# Patient Record
Sex: FEMALE | Race: WHITE | NOT HISPANIC OR LATINO | Employment: FULL TIME | URBAN - METROPOLITAN AREA
[De-identification: names, ages, dates, MRNs, and addresses within clinical notes are randomized per-mention and may not be internally consistent; named-entity substitution may affect disease eponyms.]

---

## 2017-02-04 ENCOUNTER — COMMUNICATION - HEALTHEAST (OUTPATIENT)
Dept: FAMILY MEDICINE | Facility: CLINIC | Age: 37
End: 2017-02-04

## 2017-02-07 ENCOUNTER — OFFICE VISIT - HEALTHEAST (OUTPATIENT)
Dept: FAMILY MEDICINE | Facility: CLINIC | Age: 37
End: 2017-02-07

## 2017-02-07 DIAGNOSIS — R05.9 COUGH: ICD-10-CM

## 2017-02-07 ASSESSMENT — MIFFLIN-ST. JEOR: SCORE: 1186.56

## 2017-02-09 ENCOUNTER — COMMUNICATION - HEALTHEAST (OUTPATIENT)
Dept: FAMILY MEDICINE | Facility: CLINIC | Age: 37
End: 2017-02-09

## 2017-06-13 ENCOUNTER — COMMUNICATION - HEALTHEAST (OUTPATIENT)
Dept: FAMILY MEDICINE | Facility: CLINIC | Age: 37
End: 2017-06-13

## 2017-06-13 DIAGNOSIS — I47.19 JUNCTIONAL TACHYCARDIA (H): ICD-10-CM

## 2017-11-21 ENCOUNTER — OFFICE VISIT - HEALTHEAST (OUTPATIENT)
Dept: FAMILY MEDICINE | Facility: CLINIC | Age: 37
End: 2017-11-21

## 2017-11-21 DIAGNOSIS — M54.41 ACUTE BILATERAL LOW BACK PAIN WITH BILATERAL SCIATICA: ICD-10-CM

## 2017-11-21 DIAGNOSIS — G44.229 CHRONIC TENSION-TYPE HEADACHE, NOT INTRACTABLE: ICD-10-CM

## 2017-11-21 DIAGNOSIS — M54.42 ACUTE BILATERAL LOW BACK PAIN WITH BILATERAL SCIATICA: ICD-10-CM

## 2017-11-21 ASSESSMENT — MIFFLIN-ST. JEOR: SCORE: 1194.78

## 2017-11-21 NOTE — ASSESSMENT & PLAN NOTE
She has been studying for her graduate program a lot lately and started to have some generalized low back pain.  This worsened after she used a heating pad for prolonged period of time.  The painhas been persistent and she is reluctant to take ibuprofen.  After we talked today she did agree to take 600 mg's of ibuprofen 3 times daily.  We will also start some physical therapy to see if we can help strengthen  these muscles and relieve the pain.

## 2017-11-21 NOTE — ASSESSMENT & PLAN NOTE
Pt/ot recommended.   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  She says she has a lot of forward postures and a lot of neck and shoulder tightness.  These lead to headaches.  Massage is not helpful because her muscles are so tense.  Recommend physical therapy/occupational therapy to learn scapular depressor exercises and to prevent worsening.

## 2017-11-28 ENCOUNTER — OFFICE VISIT - HEALTHEAST (OUTPATIENT)
Dept: PHYSICAL THERAPY | Facility: REHABILITATION | Age: 37
End: 2017-11-28

## 2017-11-28 DIAGNOSIS — M54.6 PAIN IN THORACIC SPINE: ICD-10-CM

## 2017-11-28 DIAGNOSIS — M54.41 ACUTE BILATERAL LOW BACK PAIN WITH BILATERAL SCIATICA: ICD-10-CM

## 2017-11-28 DIAGNOSIS — M54.42 ACUTE BILATERAL LOW BACK PAIN WITH BILATERAL SCIATICA: ICD-10-CM

## 2018-02-05 ENCOUNTER — COMMUNICATION - HEALTHEAST (OUTPATIENT)
Dept: FAMILY MEDICINE | Facility: CLINIC | Age: 38
End: 2018-02-05

## 2018-02-16 ENCOUNTER — COMMUNICATION - HEALTHEAST (OUTPATIENT)
Dept: FAMILY MEDICINE | Facility: CLINIC | Age: 38
End: 2018-02-16

## 2018-02-16 DIAGNOSIS — I47.19 JUNCTIONAL TACHYCARDIA (H): ICD-10-CM

## 2018-03-28 ENCOUNTER — OFFICE VISIT - HEALTHEAST (OUTPATIENT)
Dept: FAMILY MEDICINE | Facility: CLINIC | Age: 38
End: 2018-03-28

## 2018-03-28 DIAGNOSIS — Z00.00 HEALTHCARE MAINTENANCE: ICD-10-CM

## 2018-03-28 DIAGNOSIS — F41.9 ANXIETY: ICD-10-CM

## 2018-03-28 DIAGNOSIS — N94.3 PREMENSTRUAL SYNDROME: ICD-10-CM

## 2018-03-28 DIAGNOSIS — R14.1 GAS PAIN: ICD-10-CM

## 2018-03-28 LAB
CHOLEST SERPL-MCNC: 147 MG/DL
FASTING STATUS PATIENT QL REPORTED: YES
FASTING STATUS PATIENT QL REPORTED: YES
GLUCOSE BLD-MCNC: 88 MG/DL (ref 70–99)
HDLC SERPL-MCNC: 57 MG/DL
HGB BLD-MCNC: 13.9 G/DL (ref 12–16)
LDLC SERPL CALC-MCNC: 77 MG/DL
TRIGL SERPL-MCNC: 67 MG/DL

## 2018-03-28 ASSESSMENT — MIFFLIN-ST. JEOR: SCORE: 1186.28

## 2018-06-05 ENCOUNTER — COMMUNICATION - HEALTHEAST (OUTPATIENT)
Dept: FAMILY MEDICINE | Facility: CLINIC | Age: 38
End: 2018-06-05

## 2018-06-05 DIAGNOSIS — I47.19 JUNCTIONAL TACHYCARDIA (H): ICD-10-CM

## 2018-10-18 ENCOUNTER — AMBULATORY - HEALTHEAST (OUTPATIENT)
Dept: NURSING | Facility: CLINIC | Age: 38
End: 2018-10-18

## 2019-06-24 ENCOUNTER — COMMUNICATION - HEALTHEAST (OUTPATIENT)
Dept: FAMILY MEDICINE | Facility: CLINIC | Age: 39
End: 2019-06-24

## 2019-06-24 DIAGNOSIS — I47.19 JUNCTIONAL TACHYCARDIA (H): ICD-10-CM

## 2019-07-08 ENCOUNTER — OFFICE VISIT - HEALTHEAST (OUTPATIENT)
Dept: FAMILY MEDICINE | Facility: CLINIC | Age: 39
End: 2019-07-08

## 2019-07-08 DIAGNOSIS — Z00.00 ROUTINE GENERAL MEDICAL EXAMINATION AT A HEALTH CARE FACILITY: ICD-10-CM

## 2019-07-08 DIAGNOSIS — I47.19 JUNCTIONAL TACHYCARDIA (H): ICD-10-CM

## 2019-07-08 LAB
ALBUMIN SERPL-MCNC: 4.1 G/DL (ref 3.5–5)
ALP SERPL-CCNC: 56 U/L (ref 45–120)
ALT SERPL W P-5'-P-CCNC: 17 U/L (ref 0–45)
ANION GAP SERPL CALCULATED.3IONS-SCNC: 11 MMOL/L (ref 5–18)
AST SERPL W P-5'-P-CCNC: 17 U/L (ref 0–40)
BILIRUB SERPL-MCNC: 0.4 MG/DL (ref 0–1)
BUN SERPL-MCNC: 18 MG/DL (ref 8–22)
CALCIUM SERPL-MCNC: 9.8 MG/DL (ref 8.5–10.5)
CHLORIDE BLD-SCNC: 106 MMOL/L (ref 98–107)
CHOLEST SERPL-MCNC: 139 MG/DL
CO2 SERPL-SCNC: 24 MMOL/L (ref 22–31)
CREAT SERPL-MCNC: 0.83 MG/DL (ref 0.6–1.1)
FASTING STATUS PATIENT QL REPORTED: NO
GFR SERPL CREATININE-BSD FRML MDRD: >60 ML/MIN/1.73M2
GLUCOSE BLD-MCNC: 64 MG/DL (ref 70–125)
HDLC SERPL-MCNC: 55 MG/DL
HGB BLD-MCNC: 13.9 G/DL (ref 12–16)
LDLC SERPL CALC-MCNC: 72 MG/DL
POTASSIUM BLD-SCNC: 4.2 MMOL/L (ref 3.5–5)
PROT SERPL-MCNC: 7.4 G/DL (ref 6–8)
SODIUM SERPL-SCNC: 141 MMOL/L (ref 136–145)
TRIGL SERPL-MCNC: 62 MG/DL
TSH SERPL DL<=0.005 MIU/L-ACNC: 1.17 UIU/ML (ref 0.3–5)

## 2019-07-08 ASSESSMENT — MIFFLIN-ST. JEOR: SCORE: 1191.38

## 2020-08-01 ENCOUNTER — AMBULATORY - HEALTHEAST (OUTPATIENT)
Dept: MULTI SPECIALTY CLINIC | Facility: CLINIC | Age: 40
End: 2020-08-01

## 2020-08-01 ENCOUNTER — TRANSFERRED RECORDS (OUTPATIENT)
Dept: MULTI SPECIALTY CLINIC | Facility: CLINIC | Age: 40
End: 2020-08-01

## 2020-08-01 LAB
PAP SMEAR - HIM PATIENT REPORTED: NORMAL
PAP SMEAR - HIM PATIENT REPORTED: NORMAL

## 2020-08-03 ENCOUNTER — COMMUNICATION - HEALTHEAST (OUTPATIENT)
Dept: FAMILY MEDICINE | Facility: CLINIC | Age: 40
End: 2020-08-03

## 2020-08-03 DIAGNOSIS — I47.19 JUNCTIONAL TACHYCARDIA (H): ICD-10-CM

## 2020-08-11 ENCOUNTER — OFFICE VISIT - HEALTHEAST (OUTPATIENT)
Dept: FAMILY MEDICINE | Facility: CLINIC | Age: 40
End: 2020-08-11

## 2020-08-11 DIAGNOSIS — I47.19 JUNCTIONAL ECTOPIC TACHYCARDIA (H): ICD-10-CM

## 2020-11-02 ENCOUNTER — COMMUNICATION - HEALTHEAST (OUTPATIENT)
Dept: FAMILY MEDICINE | Facility: CLINIC | Age: 40
End: 2020-11-02

## 2020-11-02 DIAGNOSIS — I47.19 JUNCTIONAL TACHYCARDIA (H): ICD-10-CM

## 2021-01-11 RX ORDER — DESOGESTREL AND ETHINYL ESTRADIOL 21-5 (28)
KIT ORAL
COMMUNITY
Start: 2020-06-11 | End: 2023-11-03

## 2021-01-11 RX ORDER — ATENOLOL 25 MG/1
TABLET ORAL
COMMUNITY
Start: 2020-11-04 | End: 2021-08-06

## 2021-01-12 ENCOUNTER — OFFICE VISIT (OUTPATIENT)
Dept: FAMILY MEDICINE | Facility: CLINIC | Age: 41
End: 2021-01-12
Payer: COMMERCIAL

## 2021-01-12 VITALS
TEMPERATURE: 98.3 F | SYSTOLIC BLOOD PRESSURE: 98 MMHG | DIASTOLIC BLOOD PRESSURE: 62 MMHG | HEIGHT: 65 IN | BODY MASS INDEX: 20.35 KG/M2 | HEART RATE: 68 BPM | WEIGHT: 122.13 LBS | RESPIRATION RATE: 12 BRPM

## 2021-01-12 DIAGNOSIS — M54.6 ACUTE LEFT-SIDED THORACIC BACK PAIN: Primary | ICD-10-CM

## 2021-01-12 PROCEDURE — 99213 OFFICE O/P EST LOW 20 MIN: CPT | Performed by: FAMILY MEDICINE

## 2021-01-12 RX ORDER — DAPSONE 50 MG/G
GEL TOPICAL
COMMUNITY
Start: 2020-12-09 | End: 2022-06-20

## 2021-01-12 RX ORDER — CYCLOBENZAPRINE HCL 5 MG
5 TABLET ORAL 3 TIMES DAILY PRN
Qty: 20 TABLET | Refills: 1 | Status: SHIPPED | OUTPATIENT
Start: 2021-01-12 | End: 2022-06-20

## 2021-01-12 RX ORDER — ADAPALENE GEL USP, 0.3% 3 MG/G
GEL TOPICAL
COMMUNITY
Start: 2020-01-26

## 2021-01-12 ASSESSMENT — MIFFLIN-ST. JEOR: SCORE: 1216.9

## 2021-01-12 NOTE — PROGRESS NOTES
Assessment/Plan:    Mary Anne Sanchez is a 40 year old female presenting for:    Acute left-sided thoracic back pain  This seems to be very muscular nature.  Flexeril sent to the pharmacy and discussed the risks and benefits of the medication.  A sickle therapy referral was placed as well.  Discussed some different exercises she could do.  She is planning on seeing her chiropractor as well.  - PHYSICAL THERAPY REFERRAL  - cyclobenzaprine (FLEXERIL) 5 MG tablet  Dispense: 20 tablet; Refill: 1    We discussed her wrist tendinitis.  She will wear her wrist brace which she has at home.  Discussed getting a different mouse or at least icing the wrist twice daily.    There are no discontinued medications.        Chief Complaint:  Back Pain and Musculoskeletal Problem        Subjective:   Mary Anne Sanchez is a very pleasant 40-year-old female presenting to the clinic today with concerns over back pain.  She states that she has had some back pain for the last few months but has been severely exacerbated by working from home.  She states that the pain is in the upper thoracic area.  This became acutely worse a few weeks ago.  She did go to chiropractor who did an adjustment.  She states that it was very painful for a few days after the adjustment however it improved after 2 to 3 days.  She then went sledding and skiing with her family.  She did not fall and there was no trauma but she was very uncomfortable for the next few days.  She even states that she could barely sleep at night due to the pain.    She notes that she has been doing stretching and yoga which sometimes seems to help but states that the pain waxes and wanes.  She notes that the most painful area is a specific spot just under her left shoulder blade that is uncomfortable when she presses on it and even uncomfortable sometimes when she takes a deep breath.    She also notes that she has been having some right-sided hand tendinitis and cramping.   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  She will be going back to teaching in person in the next week and a half.    12 point review of systems completed and negative except for what has been described above    History   Smoking Status     Never Smoker   Smokeless Tobacco     Never Used         Current Outpatient Medications:      adapalene (DIFFERIN) 0.3 % external gel, , Disp: , Rfl:      atenolol (TENORMIN) 25 MG tablet, TAKE ONE TABLET BY MOUTH ONCE DAILY (MAY TAKE AN EXTRA TABLET AS NEEDED AT ONSET OF SYMPTOMS ), Disp: , Rfl:      cyclobenzaprine (FLEXERIL) 5 MG tablet, Take 1 tablet (5 mg) by mouth 3 times daily as needed for muscle spasms, Disp: 20 tablet, Rfl: 1     dapsone 5 % topical gel, , Disp: , Rfl:      desogestrel-ethinyl estradiol (KARIVA) 0.15-0.02/0.01 MG (21/5) tablet, , Disp: , Rfl:      Multiple Vitamin (MULTI-VITAMIN DAILY PO), , Disp: , Rfl:       Objective:  Vitals:    01/12/21 0948   BP: 98/62   Pulse: 68   Resp: 12   Temp: 98.3  F (36.8  C)   TempSrc: Tympanic   Weight: 55.4 kg (122 lb 2 oz)   Height: 1.638 m (5' 4.5\")       Body mass index is 20.64 kg/m .    Vital signs reviewed and stable  General: No acute distress  Psych: Appropriate affect  HEENT: moist mucous membranes  Musculoskeletal: Tenderness to palpation over the mid thoracic area particularly on the left.  Good range of motion in arms and flexion extension and rotation of back.    Right wrist has good range of motion.  No obvious swelling noted.  Good strength.  Extremities: warm and well perfused with no edema  Skin: warm and dry with no rash         This note has been dictated and transcribed using voice recognition software.   Any errors in transcription are unintentional and inherent to the software.  "

## 2021-01-27 ENCOUNTER — TELEPHONE (OUTPATIENT)
Dept: FAMILY MEDICINE | Facility: CLINIC | Age: 41
End: 2021-01-27

## 2021-01-28 ENCOUNTER — COMMUNICATION - HEALTHEAST (OUTPATIENT)
Dept: FAMILY MEDICINE | Facility: CLINIC | Age: 41
End: 2021-01-28

## 2021-01-29 ENCOUNTER — ANCILLARY PROCEDURE (OUTPATIENT)
Dept: GENERAL RADIOLOGY | Facility: CLINIC | Age: 41
End: 2021-01-29
Attending: FAMILY MEDICINE
Payer: COMMERCIAL

## 2021-01-29 ENCOUNTER — OFFICE VISIT (OUTPATIENT)
Dept: FAMILY MEDICINE | Facility: CLINIC | Age: 41
End: 2021-01-29
Payer: COMMERCIAL

## 2021-01-29 VITALS
DIASTOLIC BLOOD PRESSURE: 78 MMHG | SYSTOLIC BLOOD PRESSURE: 120 MMHG | TEMPERATURE: 98.6 F | HEART RATE: 65 BPM | RESPIRATION RATE: 18 BRPM

## 2021-01-29 DIAGNOSIS — M54.6 ACUTE LEFT-SIDED THORACIC BACK PAIN: ICD-10-CM

## 2021-01-29 DIAGNOSIS — M54.6 ACUTE LEFT-SIDED THORACIC BACK PAIN: Primary | ICD-10-CM

## 2021-01-29 LAB
ERYTHROCYTE [DISTWIDTH] IN BLOOD BY AUTOMATED COUNT: 11.5 % (ref 10–15)
HCT VFR BLD AUTO: 40.9 % (ref 35–47)
HGB BLD-MCNC: 13.6 G/DL (ref 11.7–15.7)
MCH RBC QN AUTO: 30.6 PG (ref 26.5–33)
MCHC RBC AUTO-ENTMCNC: 33.3 G/DL (ref 31.5–36.5)
MCV RBC AUTO: 92 FL (ref 78–100)
PLATELET # BLD AUTO: 294 10E9/L (ref 150–450)
RBC # BLD AUTO: 4.44 10E12/L (ref 3.8–5.2)
WBC # BLD AUTO: 5 10E9/L (ref 4–11)

## 2021-01-29 PROCEDURE — 72070 X-RAY EXAM THORAC SPINE 2VWS: CPT | Mod: FY | Performed by: RADIOLOGY

## 2021-01-29 PROCEDURE — 85027 COMPLETE CBC AUTOMATED: CPT | Performed by: FAMILY MEDICINE

## 2021-01-29 PROCEDURE — 36415 COLL VENOUS BLD VENIPUNCTURE: CPT | Performed by: FAMILY MEDICINE

## 2021-01-29 PROCEDURE — 99214 OFFICE O/P EST MOD 30 MIN: CPT | Performed by: FAMILY MEDICINE

## 2021-01-29 PROCEDURE — 71046 X-RAY EXAM CHEST 2 VIEWS: CPT | Mod: FY | Performed by: RADIOLOGY

## 2021-01-29 NOTE — PROGRESS NOTES
Assessment/Plan:    Mary Anne Sanchez is a 40 year old female presenting for:    Acute left-sided thoracic back pain  I personally reviewed the x-ray of her chest and thoracic spine.  Chest x-ray is normal with no pneumothorax.  X-ray of her thoracic spine is normal as well.  We will will await radiology final read.  CBC is normal today.    Discussed continuing to see physical therapy for her thoracic back pain.  We could also have her see the spine clinic.  I did offer to order an MRI as well given the nature of the pain however she declines at this point.  She will continue to use physical therapy and contact me over the next 3 to 4 weeks to let me know how she is doing.  - XR Chest 2 Views  - XR Thoracic Spine 2 Views  - COVID-19 Virus (Coronavirus) Antibody & Titer Reflex  - CBC with platelets      There are no discontinued medications.        Chief Complaint:  Back Pain        Subjective:   Mary Anne Sanchez is a very pleasant 40-year-old female presenting to the clinic today for follow-up of thoracic back pain.  I saw this patient about 3 weeks ago for back pain that was thoracic in nature.  Recommended physical therapy as she is already been seeing a chiropractor.  She is going to physical therapy to time.  She states that it helps somewhat but she continues to have the pain.  Some days it is not so severe but some days it is to the point where she needs to lay down on the floor due to the pain.  The pain does not radiate down her arms or legs.  It is mostly to the left of her thoracic spine.  When she stretches the area does feel slightly better.    She does have a history of a pneumothorax and has had some mild shortness of breath and thus was wondering about some imaging scans today.  There is no trauma to her back.    She is otherwise not been sick.  Eating and drinking well.    12 point review of systems completed and negative except for what has been described above    History   Smoking Status      Never Smoker   Smokeless Tobacco     Never Used         Current Outpatient Medications:      adapalene (DIFFERIN) 0.3 % external gel, , Disp: , Rfl:      atenolol (TENORMIN) 25 MG tablet, TAKE ONE TABLET BY MOUTH ONCE DAILY (MAY TAKE AN EXTRA TABLET AS NEEDED AT ONSET OF SYMPTOMS ), Disp: , Rfl:      cyclobenzaprine (FLEXERIL) 5 MG tablet, Take 1 tablet (5 mg) by mouth 3 times daily as needed for muscle spasms, Disp: 20 tablet, Rfl: 1     dapsone 5 % topical gel, , Disp: , Rfl:      desogestrel-ethinyl estradiol (KARIVA) 0.15-0.02/0.01 MG (21/5) tablet, , Disp: , Rfl:      Multiple Vitamin (MULTI-VITAMIN DAILY PO), , Disp: , Rfl:       Objective:  Vitals:    01/29/21 0817   BP: 120/78   Pulse: 65   Resp: 18   Temp: 98.6  F (37  C)       There is no height or weight on file to calculate BMI.    Vital signs reviewed and stable  General: No acute distress  Psych: Appropriate affect  HEENT: moist mucous membranes  Lymph: no cervical or supraclavicular lymphadenopathy  Cardiovascular: regular rate and rhythm with no murmur  Pulmonary: clear to auscultation bilaterally with no wheeze  Abdomen: soft, non tender, non distended with normo-active bowel sounds  Extremities: warm and well perfused with no edema  Skin: warm and dry with no rash  Musculoskeletal: Some tenderness to palpation over the paraspinal muscles on the left thoracic spine.  Normal range of motion left shoulder.       This note has been dictated and transcribed using voice recognition software.   Any errors in transcription are unintentional and inherent to the software.

## 2021-02-01 DIAGNOSIS — M54.6 ACUTE LEFT-SIDED THORACIC BACK PAIN: ICD-10-CM

## 2021-02-01 PROCEDURE — 86769 SARS-COV-2 COVID-19 ANTIBODY: CPT | Performed by: FAMILY MEDICINE

## 2021-02-01 PROCEDURE — 36415 COLL VENOUS BLD VENIPUNCTURE: CPT | Performed by: FAMILY MEDICINE

## 2021-02-02 LAB
SARS-COV-2 AB PNL SERPL IA: NEGATIVE
SARS-COV-2 IGG SERPL IA-ACNC: NORMAL

## 2021-02-20 ENCOUNTER — HEALTH MAINTENANCE LETTER (OUTPATIENT)
Age: 41
End: 2021-02-20

## 2021-05-30 VITALS — BODY MASS INDEX: 20.01 KG/M2 | WEIGHT: 117.19 LBS | HEIGHT: 64 IN

## 2021-05-30 NOTE — TELEPHONE ENCOUNTER
Left message for pt to call back to schedule an appt.  Once appt has been scheduled, a refill can be sent to get pt through.

## 2021-05-30 NOTE — PROGRESS NOTES
"Assessment/Plan:    Mary Anne Sanchez is a 38 y.o. female presenting for:    1. Routine general medical examination at a health care facility  - Thyroid Cascade  - Hemoglobin  - Comprehensive Metabolic Panel  - Lipid Cascade RANDOM    2. Junctional tachycardia (H)  Given some of her concerns with lightheadedness I have had her decrease her atenolol to 12.5 mg in the morning with the option to repeat that dose if needed.  She knows that she can take more on an as-needed basis.  Hopefully this will ameliorate some of her side effects while also controlling her pulse.  She would like to follow-up with Dr. Prado for a \"checkup\" although we did discuss that I am unsure if there is much to be done at this point.  She states that she does have 1 week in her cycle that she feels as though she is may be a bit more anxious which causes some chest type of symptoms.  We did discuss doing a Holter/event monitor throughout that time and she will consider this.  - Ambulatory referral to Cardiology  - atenolol (TENORMIN) 25 MG tablet; TAKE ONE TABLET BY MOUTH EVERY DAY (MAY TAKE AN EXTRA TABLET AS NEEDED AT ONSET OF SYMPTOMS)  Dispense: 90 tablet; Refill: 3        Medications Discontinued During This Encounter   Medication Reason     sulfacetamide sodium-sulfur 10-5 % (w/w) Clsr      atenolol (TENORMIN) 25 MG tablet Reorder           Chief Complaint:  Chief Complaint   Patient presents with     Medication Education Visit     med check     Medication Refill       Subjective:   Mary Anne Sanchez is a very pleasant 38-year-old female with a past medical history significant for junctional tachycardia presenting to the clinic today for a med check.  Patient currently takes atenolol 25 mg in the morning.  She states that she feels as though her heart rate is fairly well-controlled with this.  She does note that sometimes she will feel a bit lightheaded -often this occurs when she is a bit more fatigued.  She has never had a syncopal " "episode.  She does not note any palpitations with this and has checked her pulse and does not think that it is going fast with these episodes.  She also notes that there is one week a month before her menstrual cycle that she sometimes will have some chest tightness.  This is not at all related to activity.  No shortness of breath.  She feels as though this is likely more related to anxiety.  She does not really have any pain in the tightness is bilateral.    She recently went hiking in [place] and did not have any chest symptoms or shortness of breath with this.    She continues to have normal menstrual cycles.    12 point review of systems completed and negative except for what has been described above    Social History     Tobacco Use   Smoking Status Never Smoker   Smokeless Tobacco Never Used       Current Outpatient Medications   Medication Sig Note     ACZONE 5 % topical gel  2/7/2017: Received from: External Pharmacy     adapalene (DIFFERIN) 0.3 % gel  2/7/2017: Received from: External Pharmacy     ALPRAZolam (XANAX) 0.25 MG tablet Take 1 tablet (0.25 mg total) by mouth 3 (three) times a day as needed for anxiety.      atenolol (TENORMIN) 25 MG tablet TAKE ONE TABLET BY MOUTH EVERY DAY (MAY TAKE AN EXTRA TABLET AS NEEDED AT ONSET OF SYMPTOMS)      MULTIVIT &MINERALS/FERROUS FUM (MULTI VITAMIN ORAL) Take by mouth.          Objective:  Vitals:    07/08/19 0801   BP: 94/58   Pulse: (!) 56   Resp: 12   Temp: 98  F (36.7  C)   TempSrc: Oral   Weight: 116 lb 8 oz (52.8 kg)   Height: 5' 4.5\" (1.638 m)       Body mass index is 19.69 kg/m .    Vital signs reviewed and stable  General: No acute distress  Psych: Appropriate affect  HEENT: moist mucous membranes  Lymph: no cervical or supraclavicular lymphadenopathy  Cardiovascular: regular rate and rhythm with no murmur  Pulmonary: clear to auscultation bilaterally with no wheeze  Abdomen: soft, non tender, non distended with normo-active bowel sounds  Extremities: " warm and well perfused with no edema  Skin: warm and dry with no rash         This note has been dictated and transcribed using voice recognition software.   Any errors in transcription are unintentional and inherent to the software.

## 2021-05-30 NOTE — TELEPHONE ENCOUNTER
RN cannot approve Refill Request    RN can NOT refill this medication Protocol failed and NO refill given        Lindsay Burns, [facility] Triage/Med Refill 6/25/2019    Requested Prescriptions   Pending Prescriptions Disp Refills     atenolol (TENORMIN) 25 MG tablet [Pharmacy Med Name: ATENOLOL 25MG TABS] 90 tablet 3     Sig: TAKE ONE TABLET BY MOUTH EVERY DAY (MAY TAKE AN EXTRA TABLET AS NEEDED AT ONSET OF SYMPTOMS)       Beta-Blockers Refill Protocol Failed - 6/24/2019  9:18 AM        Failed - PCP or prescribing provider visit in past 12 months or next 3 months     Last office visit with prescriber/PCP: 2/7/2017 Alla Vang MD OR same dept: Visit date not found OR same specialty: 11/21/2017 Yvette Robbins MD  Last physical: 3/28/2018 Last MTM visit: Visit date not found   Next visit within 3 mo: Visit date not found  Next physical within 3 mo: Visit date not found  Prescriber OR PCP: Alla Vang MD  Last diagnosis associated with med order: 1. Junctional tachycardia (H)  - atenolol (TENORMIN) 25 MG tablet [Pharmacy Med Name: ATENOLOL 25MG TABS]; TAKE ONE TABLET BY MOUTH EVERY DAY (MAY TAKE AN EXTRA TABLET AS NEEDED AT ONSET OF SYMPTOMS)  Dispense: 90 tablet; Refill: 3    If protocol passes may refill for 12 months if within 3 months of last provider visit (or a total of 15 months).             Failed - Blood pressure filed in past 12 months     BP Readings from Last 1 Encounters:   03/28/18 98/60

## 2021-05-31 VITALS — BODY MASS INDEX: 20.32 KG/M2 | HEIGHT: 64 IN | WEIGHT: 119 LBS

## 2021-06-01 VITALS — WEIGHT: 115.38 LBS | BODY MASS INDEX: 19.22 KG/M2 | HEIGHT: 65 IN

## 2021-06-02 ENCOUNTER — RECORDS - HEALTHEAST (OUTPATIENT)
Dept: ADMINISTRATIVE | Facility: CLINIC | Age: 41
End: 2021-06-02

## 2021-06-03 VITALS — WEIGHT: 116.5 LBS | HEIGHT: 65 IN | BODY MASS INDEX: 19.41 KG/M2

## 2021-06-08 NOTE — PROGRESS NOTES
Assessment/Plan:    Mary Anne Sanchez is a 36 y.o. female presenting for:    The patient's chest pain is most likely due to pleuritis.  I have encouraged her to continue taking the ibuprofen 3 times daily on a scheduled basis.  She will also try using a heating pad on the area as well as placing a pillow on the area with sleeping.  Likely this will improve within the next for 5 days.  If she is not feeling any improvement by next Monday I have encouraged her to contact me again and we could consider doing some steroids although I do not think they would be beneficial at this point.    1. Cough  Likely postviral.  Recent chest x-ray was normal.  We'll send Tessalon Perles to the pharmacy to help with the coughing.  Cough syrup with codeine for night.  - benzonatate (TESSALON PERLES) 100 MG capsule; Take 1 capsule (100 mg total) by mouth 3 (three) times a day as needed for cough.  Dispense: 20 capsule; Refill: 0  - codeine-guaiFENesin (GUAIFENESIN AC)  mg/5 mL liquid; Take 5 mL by mouth 3 (three) times a day as needed for cough.  Dispense: 120 mL; Refill: 0        Medications Discontinued During This Encounter   Medication Reason     adapalene (DIFFERIN) 0.1 % cream      hydrocortisone 2.5 % ointment Therapy completed           Chief Complaint:  Chief Complaint   Patient presents with     Follow-up     [facility] ER Chest pain- states its still the same no improvement       Subjective:   Mary Anne Sanchez is a very pleasant 36-year-old female presenting to the clinic today with concerns of her right-sided chest pain.  The patient has a past medical history significant for SVT as well as pneumothorax.  She has had a cold with a cough for the last 2 and half weeks.  She noted that this weekend she was having fairly severe right-sided chest pain and was actually seen in the emergency department due to concerns regarding pneumothorax or a blood clot.  Chest x-ray was normal.  D-dimer was normal.  She was  "diagnosed with muscle wall pain and told to schedule ibuprofen.  She's been doing that and finds that it is mildly helpful but when she coughs or takes a deep breath the pain is still very severe on her right side.  She has not had any fevers.  Her cough is slightly better than it has been but she wonders if there is medication she could take for that as well.    She has been eating and drinking well.  Her children have had viral upper respiratory infections as well.    12 point review of systems completed and negative except for what has been described above    History   Smoking Status     Never Smoker   Smokeless Tobacco     Never Used       Current Outpatient Prescriptions   Medication Sig Note     ACZONE 5 % topical gel  2/7/2017: Received from: External Pharmacy     adapalene (DIFFERIN) 0.3 % gel  2/7/2017: Received from: External Pharmacy     atenolol (TENORMIN) 25 MG tablet TAKE ONE TABLET BY MOUTH EVERY DAY (MAY TAKE AN EXTRA TABLET AS NEEDED AT ONSET OF SYMPTOMS)      ibuprofen (ADVIL,MOTRIN) 600 MG tablet Take 1 tablet (600 mg total) by mouth every 6 (six) hours as needed for pain.      MULTIVIT &MINERALS/FERROUS FUM (MULTI VITAMIN ORAL) Take by mouth.      benzonatate (TESSALON PERLES) 100 MG capsule Take 1 capsule (100 mg total) by mouth 3 (three) times a day as needed for cough.      codeine-guaiFENesin (GUAIFENESIN AC)  mg/5 mL liquid Take 5 mL by mouth 3 (three) times a day as needed for cough.          Objective:  Vitals:    02/07/17 1336   BP: 90/58   Pulse: 64   Resp: 12   Temp: 98.3  F (36.8  C)   TempSrc: Oral   Weight: 117 lb 3 oz (53.2 kg)   Height: 5' 4\" (1.626 m)       Vital signs reviewed and stable  General: No acute distress  Psych: Appropriate affect  HEENT: moist mucous membranes, pupils equal, round, reactive to light and accomodation, posterior oropharynx clear of erythema or exudate, tympanic membranes are pearly grey bilaterally  Lymph: no cervical or supraclavicular " lymphadenopathy  Cardiovascular: regular rate and rhythm with no murmur  Pulmonary: clear to auscultation bilaterally with no wheeze  Extremities: warm and well perfused with no edema  Skin: warm and dry with no rash  Muscular skeletal: Mild tenderness to palpation along the right chest wall laterally.       This note has been dictated and transcribed using voice recognition software.   Any errors in transcription are unintentional and inherent to the software.

## 2021-06-10 NOTE — TELEPHONE ENCOUNTER
RN cannot approve Refill Request    RN can NOT refill this medication Protocol failed and NO refill given. Last office visit: 7/8/2019 Alla Vang MD Last Physical: 3/28/2018 Last MTM visit: Visit date not found Last visit same specialty: 7/8/2019 Alla Vang MD.  Next visit within 3 mo: Visit date not found  Next physical within 3 mo: Visit date not found      Lindsay Burns, [facility] Triage/Med Refill 8/3/2020    Requested Prescriptions   Pending Prescriptions Disp Refills     atenoloL (TENORMIN) 25 MG tablet [Pharmacy Med Name: ATENOLOL 25MG TABS] 90 tablet 3     Sig: TAKE ONE TABLET BY MOUTH ONCE DAILY (MAY TAKE AN EXTRA TABLET AS NEEDED AT ONSET OF SYMPTOMS )       Beta-Blockers Refill Protocol Failed - 8/3/2020  7:58 AM        Failed - PCP or prescribing provider visit in past 12 months or next 3 months     Last office visit with prescriber/PCP: 7/8/2019 Alla Vang MD OR same dept: Visit date not found OR same specialty: 7/8/2019 Alla Vang MD  Last physical: 3/28/2018 Last MTM visit: Visit date not found   Next visit within 3 mo: Visit date not found  Next physical within 3 mo: Visit date not found  Prescriber OR PCP: Alla Vang MD  Last diagnosis associated with med order: 1. Junctional tachycardia (H)  - atenoloL (TENORMIN) 25 MG tablet [Pharmacy Med Name: ATENOLOL 25MG TABS]; TAKE ONE TABLET BY MOUTH ONCE DAILY (MAY TAKE AN EXTRA TABLET AS NEEDED AT ONSET OF SYMPTOMS )  Dispense: 90 tablet; Refill: 3    If protocol passes may refill for 12 months if within 3 months of last provider visit (or a total of 15 months).             Failed - Blood pressure filed in past 12 months     BP Readings from Last 1 Encounters:   07/08/19 94/58

## 2021-06-10 NOTE — TELEPHONE ENCOUNTER
Please help this patient make a video appointment NEXT WEEK ideally and route back to me for limited refill if needed    Thanks!    BB

## 2021-06-10 NOTE — PROGRESS NOTES
"Mary Anne Sanchez is a 39 y.o. female who is being evaluated via a billable video visit.      The patient has been notified of following:     \"This video visit will be conducted via a call between you and your physician/provider. We have found that certain health care needs can be provided without the need for an in-person physical exam.  This service lets us provide the care you need with a video conversation.  If a prescription is necessary we can send it directly to your pharmacy.  If lab work is needed we can place an order for that and you can then stop by our lab to have the test done at a later time.    Video visits are billed at different rates depending on your insurance coverage. Please reach out to your insurance provider with any questions.    If during the course of the call the physician/provider feels a video visit is not appropriate, you will not be charged for this service.\"    Patient has given verbal consent to a Video visit? Yes  How would you like to obtain your AVS? AVS Preference: FOODithart.  If dropped by the video visit, the video invitation should be sent to: Nasreen  Will anyone else be joining your video visit? No        Video Start Time: 8:00 AM    -------------------------    Assessment/Plan:    Mary Anne Sanchez is a 39 y.o. female presenting for:    1. Junctional ectopic tachycardia (H)  Doing well.  Very occasional symptoms.  Would recommend continuing atenolol.  She is stable on her dose.        There are no discontinued medications.        Chief Complaint:  Chief Complaint   Patient presents with     Medication Education Visit     Med check       Subjective:   Mary Anne Sanchez is a pleasant 39 y.o. female being evaluated via video visit today for the following concern/s:     Junctional ectopic tachycardia: Patient is currently on atenolol.  She is doing well.  She has occasional symptoms which self resolved.  She really has no questions or concerns today.    She recently saw " her gynecologist and had a Pap smear and will work on getting those records from [facility].      12 point review of systems completed and negative except for what has been described above    Social History     Tobacco Use   Smoking Status Never Smoker   Smokeless Tobacco Never Used       Current Outpatient Medications   Medication Sig Note     ACZONE 5 % topical gel  2/7/2017: Received from: External Pharmacy     adapalene (DIFFERIN) 0.3 % gel  2/7/2017: Received from: External Pharmacy     ALPRAZolam (XANAX) 0.25 MG tablet Take 1 tablet (0.25 mg total) by mouth 3 (three) times a day as needed for anxiety.      atenoloL (TENORMIN) 25 MG tablet TAKE ONE TABLET BY MOUTH ONCE DAILY (MAY TAKE AN EXTRA TABLET AS NEEDED AT ONSET OF SYMPTOMS )      desog-e.estradioL/e.estradioL (KARIVA) 0.15-0.02 mgx21 /0.01 mg x 5 per tablet       MULTIVIT &MINERALS/FERROUS FUM (MULTI VITAMIN ORAL) Take by mouth.          Objective:  No flowsheet data found.        There is no height or weight on file to calculate BMI.    General: No acute distress  Psych: Appropriate affect  HEENT: moist mucous membranes  Pulmonary: Breathing comfortably, speaking in complete sentences  Extremities: warm and well perfused with no edema  Skin: warm and dry with no rash       This note has been dictated and transcribed using voice recognition software.   Any errors in transcription are unintentional and inherent to the software.        Video-Visit Details    Type of service:  Video Visit    Video End Time (time video stopped): 811am  Originating Location (pt. Location): Home    Distant Location (provider location):  [facility]     Platform used for Video Visit: Javi Vang MD

## 2021-06-12 NOTE — TELEPHONE ENCOUNTER
Refill Approved    Rx renewed per Medication Renewal Policy. Medication was last renewed on 8/11/20 vv.    Lindsay Burns, [facility] Triage/Med Refill 11/4/2020     Requested Prescriptions   Pending Prescriptions Disp Refills     atenoloL (TENORMIN) 25 MG tablet [Pharmacy Med Name: ATENOLOL 25MG TABS] 90 tablet 0     Sig: TAKE ONE TABLET BY MOUTH ONCE DAILY (MAY TAKE AN EXTRA TABLET AS NEEDED AT ONSET OF SYMPTOMS )       Beta-Blockers Refill Protocol Failed - 11/2/2020 10:38 AM        Failed - Blood pressure filed in past 12 months     BP Readings from Last 1 Encounters:   07/08/19 94/58             Passed - PCP or prescribing provider visit in past 12 months or next 3 months     Last office visit with prescriber/PCP: 7/8/2019 Alla Vang MD OR same dept: Visit date not found OR same specialty: 7/8/2019 Alla Vang MD  Last physical: 3/28/2018 Last MTM visit: Visit date not found   Next visit within 3 mo: Visit date not found  Next physical within 3 mo: Visit date not found  Prescriber OR PCP: Alla Vang MD  Last diagnosis associated with med order: 1. Junctional tachycardia (H)  - atenoloL (TENORMIN) 25 MG tablet [Pharmacy Med Name: ATENOLOL 25MG TABS]; TAKE ONE TABLET BY MOUTH ONCE DAILY (MAY TAKE AN EXTRA TABLET AS NEEDED AT ONSET OF SYMPTOMS )  Dispense: 90 tablet; Refill: 0    If protocol passes may refill for 12 months if within 3 months of last provider visit (or a total of 15 months).

## 2021-06-14 NOTE — PROGRESS NOTES
[facility] Discharge Summary  Patient Name: Mary Anne Sanchez  Date: 12/19/2017  Referral Diagnosis:Acute bilateral low back pain with bilateral sciatica  Referring provider: Yvette Robbins MD  Visit Diagnosis:   1. Acute bilateral low back pain with bilateral sciatica     2. Pain in thoracic spine         Goals:  Pt. will be independent with home exercise program in : 4 weeks  Pt. will improve posture : and demonstrate posture with minimal to no cuing;10 minutes;in sitting;in standing;in walking;for working;for walking;for exercise;in 4 weeks  Patient will demonstrate proper body mechanics : for lifting;for bending;for squatting;for crouching;for kneeling;for other movement;with no pain;in 4 weeks      Patient was seen for 01 visits for evaluation.  Patient received a home program low back and core exercises.  The patient discontinued therapy, did not return.    Therapy will be discontinued at this time.  The patient will need a new referral to resume.    Thank you for your referral.  Shila Cobb PT  12/19/2017  10:36 AM

## 2021-06-14 NOTE — PROGRESS NOTES
"SUBJECTIVE:   Mary Anne Sanchez is a 37 y.o. female who complains of low back pain for 1 weeks, positional with bending or lifting, with radiation down the legs (especially right - intermittently). Precipitating factors: sitting and studying more than usual. Prior history of back problems: recurrent self limited episodes of low back pain in the past. There is no numbness in the legs.  There is no change in bowel or bladder function.        The pain is described as: aching .  The pain is mild to moderate in intensity. nothing makes it better but she has not tried anything really.   nothing makes it worse. It is not constant.  It does make it hard to fall asleep.     OBJECTIVE:  /60 (Patient Site: Left Arm, Patient Position: Sitting, Cuff Size: Adult Regular)  Pulse 60  Temp 98.1  F (36.7  C) (Oral)   Resp 12  Ht 5' 4\" (1.626 m)  Wt 119 lb (54 kg)  LMP 11/01/2017 (Approximate)  SpO2 98%  Breastfeeding? No  BMI 20.43 kg/m2   Normal gait.. Lumbosacral spine area reveals no local tenderness or mass.  The maximal point of tenderness is right si joint, but there is also diffuse tenderness across the low back and upper shoulder and neck muscles are generally tight.  Painful and reduced LS ROM noted.   Straight leg raise is negative at 90 degrees on both sides. DTR's, motor strength and sensation normal, including heel and toe gait and standing on toes/ heels.  Peripheral pulses are palpable. Lumbar spine X-Ray is not indicated.    ASSESSMENT:   Problem List Items Addressed This Visit     Acute bilateral low back pain with bilateral sciatica - Primary     She has been studying for her graduate program a lot lately and started to have some generalized low back pain.  This worsened after she used a heating pad for prolonged period of time.  The pain has been persistent and she is reluctant to take ibuprofen.  After we talked today she did agree to take 600 mg's of ibuprofen 3 times daily.  We will also start " some physical therapy to see if we can help strengthen  these muscles and relieve the pain.         Relevant Orders    Ambulatory referral to PT/OT    Chronic tension-type headache, not intractable     Pt/ot recommended.   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  She says she has a lot of forward postures and a lot of neck and shoulder tightness.  These lead to headaches.  Massage is not helpful because her muscles are so tense.  Recommend physical therapy/occupational therapy to learn scapular depressor exercises and to prevent worsening.

## 2021-06-14 NOTE — PROGRESS NOTES
[facility]   Initial Evaluation    Patient Name: Mary Anne Sanchez  Date of evaluation: 11/28/2017  Referral Diagnosis: Acute bilateral low back pain with bilateral sciatica  Referring provider: Yvette Robbins MD  Visit Diagnosis:     ICD-10-CM    1. Acute bilateral low back pain with bilateral sciatica M54.42     M54.41    2. Pain in thoracic spine M54.6        Assessment:       Mary Anne Sanchez is a 37 y.o. female who presents to therapy today with chief complaints of low back pain and thoracic pain. Onset date of sx was 1 month ago.  Pt reported h/o sudden pain.  Pain symptoms are 0/10 after taking advil x 3 days.  Functional impairments include bending, lifting.  Pt demo's signs and sx consistent with poor posture, rounded shoulders, hyperlordosis, poor body mechanics.     Pt. is appropriate for skilled PT intervention as outlined in the Plan of Care (POC).  Pt. is a good candidate for skilled PT services to improve pain levels and function.    Goals:  Pt. will be independent with home exercise program in : 4 weeks  Pt. will improve posture : and demonstrate posture with minimal to no cuing;10 minutes;in sitting;in standing;in walking;for working;for walking;for exercise;in 4 weeks  Patient will demonstrate proper body mechanics : for lifting;for bending;for squatting;for crouching;for kneeling;for other movement;with no pain;in 4 weeks  No Data Recorded    Patient's expectations/goals are realistic.    Barriers to Learning or Achieving Goals:  No Barriers.       Plan / Patient Instructions:        Plan of Care:   Communication with: Referral Source  Patient Related Instruction: Nature of Condition;Treatment plan and rationale;Self Care instruction;Basis of treatment;Body mechanics;Posture;Precautions;Next steps;Expected outcome  Times per Week: 1  Number of Weeks: 4  Number of Visits: 4  Discharge Planning: patient will be discharge to self care  Therapeutic Exercise:  ROM;Stretching;Strengthening  Neuromuscular Reeducation: kinesio tape;posture  A shared decision making plan was used. The patient's values and choices were respected.  The following represents what was discussed and decided upon by the provider and the patient.     Plan for next visit: posture, core strengthening, thoracic exercises, posture.     Subjective:         Social information:   Living Situation:single family home and lives with others    Occupation:kindergarden teacher.   Work Status:Working full time   Equipment Available: None    History of Present Illness:    Mary Anne is a 37 y.o. female who presents to therapy today with complaints of low back pain and thoracic pain, headaches. Date of onset/duration of symptoms is a month ago. Onset was sudden. Symptoms are intermittent and getting better. She denies history of similar symptoms. She describes their previous level of function as not limited    Pain Ratin}  Pain rating at best: 0  Pain rating at worst: 5  Pain description: aching    Functional limitations are described as occurring with:   bending  lifting    Patient reports benefit from:  advil.       Objective:      Patient Outcome Measures :    Modified Oswestry Low Back Pain Disablity Questionnaire  in %: 6   Scores range from 0-100%, where a score of 0% represents minimal pain and maximal function. The minimal clinically important difference is a score reduction of 12%.    Examination  1. Acute bilateral low back pain with bilateral sciatica     2. Pain in thoracic spine       Involved side: Bilateral  Posture Observation:      General sitting posture is  fair.  General standing posture is fair.  Cervical:  Moderate forward head  Shoulder/Thoracic complex: Moderate bilateral scapular winging  Lumbopelvic complex: Moderately increased lumbar lordosis  Gait: independent ambulatory without assistive device, flexed forward posture.    Treatment Today     TREATMENT MINUTES COMMENTS   Evaluation 30  low   Self-care/ Home management 15 Instructed on posture and body mechanics   Manual therapy     Neuromuscular Re-education     Therapeutic Activity     Therapeutic Exercises 14 Exercises:  Exercise #1: supine: breathing, knee-chest, double knee-chest, trunk rotation, bridge, thoracic lifts  Comment #1: provided written instructions.  Exercise #2: standing corner stretch     Gait training     Modality__________________                Total 59    Blank areas are intentional and mean the treatment did not include these items.         PT Evaluation Code: (Please list factors)  Patient History/Comorbidities: back pain  Examination: back pain  Clinical Presentation: stable  Clinical Decision Making: low    Patient History/  Comorbidities Examination  (body structures and functions, activity limitations, and/or participation restrictions) Clinical Presentation Clinical Decision Making (Complexity)   No documented Comorbidities or personal factors 1-2 Elements Stable and/or uncomplicated Low   1-2 documented comorbidities or personal factor 3 Elements Evolving clinical presentation with changing characteristics Moderate   3-4 documented comorbidities or personal factors 4 or more Unstable and unpredictable High         Shila Cobb PT  11/28/2017  3:58 PM

## 2021-06-16 PROBLEM — G44.229 CHRONIC TENSION-TYPE HEADACHE, NOT INTRACTABLE: Status: ACTIVE | Noted: 2017-11-21

## 2021-06-16 PROBLEM — M54.41 ACUTE BILATERAL LOW BACK PAIN WITH BILATERAL SCIATICA: Status: ACTIVE | Noted: 2017-11-21

## 2021-06-16 PROBLEM — M54.42 ACUTE BILATERAL LOW BACK PAIN WITH BILATERAL SCIATICA: Status: ACTIVE | Noted: 2017-11-21

## 2021-06-17 NOTE — PROGRESS NOTES
Assessment/Plan:     Health maintenance female exam.  All questions answered.  Await pap smear results.  Breast self exam technique reviewed and patient encouraged to perform self-exam monthly.  Discussed healthy lifestyle modifications.  Mammogram ordered.  Await fasting lab results    1. Healthcare maintenance  - Lipid Cascade FASTING  - Glucose  - Hemoglobin    2. Anxiety  She seems to be doing fairly well at baseline.  We will give her a few tabs of Xanax if she has this for her upcoming travels  - ALPRAZolam (XANAX) 0.25 MG tablet; Take 1 tablet (0.25 mg total) by mouth 3 (three) times a day as needed for anxiety.  Dispense: 15 tablet; Refill: 0    3. Premenstrual syndrome  It is unclear if all of her symptoms are due to her hormonal fluctuations (i.e. her leg pain) but I think it would be reasonable to start the patient on a progesterone only contraceptive to try to lessen the fluctuations of her hormones.  If she does well with that she will consider getting either the Depo-Provera shot or an intrauterine device.  - norethindrone (ORTHO MICRONOR) 0.35 mg tablet; Take 1 tablet (0.35 mg total) by mouth daily.  Dispense: 90 tablet; Refill: 2    4.  Gas  I encouraged her to try to pinpoint if there is anything that she is eating that is causing this or if increasing or decreasing her fiber would be helpful.  She will keep me updated if she would like a referral to the gastroenterologist.      Subjective:      Mary Anne Sanchez is a 37 y.o. female who presents for an annual exam.  She is overall doing well.  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    The patient has a few issues she would like to mention today:    1.  Premenstrual syndrome: Patient notes that a week before her period she will get hot flashes, anterior thigh pain and feel very angeles and irritable.  She states that she is always been very sensitive to hormones but thinks that the symptoms have gotten worse over the last few years.  Her   has had a vasectomy so she does not need any type of birth control but does wonder about doing some hormonal testing.  She states her menstrual cycles are still very regular.    2.  Gas: Patient states that she has a lot of gas pain.  She will noticed this in her left upper quadrant and it generally occurs once or twice a week and last for about an hour.  She cannot really relate it to anything that she is eating.  She notes that sometimes she will take Gas-X and it does seem to help it ahmet.    3.  Anxiety: Patient states that she is a fairly anxious person at baseline.  She does not take any anxiety medications but states that she will be traveling to [place] and was hopeful to potentially get something that she could take on the plane if needed.  She feels as though she is otherwise doing well with her anxiety and controlling it with lifestyle.    Healthy Habits:   Regular Exercise: Yes  Sunscreen Use: Yes  Healthy Diet: Yes  Dental Visits Regularly: Yes  Seat Belt: Yes  Sexually active: Yes  Self Breast Exam Monthly:Yes  Colonoscopy: No  Prevention of Osteoporosis: Yes  Last Dexa: N/A    Immunization History   Administered Date(s) Administered     Influenza P0g5-45, 11/20/2009     Influenza, inj, historic,unspecified 10/16/2012, 10/01/2016, 10/19/2017     Influenza, seasonal,quad inj 6-35 mos 09/27/2013     Tdap 02/17/2010     Immunization status: up to date and documented.    Gynecologic History  Patient's last menstrual period was 03/20/2018 (exact date).  Contraception: vasectomy  Last Pap: 2016. Results were: normal      OB History   No data available       Current Outpatient Prescriptions   Medication Sig Dispense Refill     ACZONE 5 % topical gel        adapalene (DIFFERIN) 0.3 % gel        atenolol (TENORMIN) 25 MG tablet TAKE ONE TABLET BY MOUTH EVERY DAY (MAY TAKE AN EXTRA TABLET AS NEEDED AT ONSET OF SYMPTOMS) 90 tablet 0     MULTIVIT &MINERALS/FERROUS FUM (MULTI VITAMIN ORAL) Take by mouth.    "    sulfacetamide sodium-sulfur 10-5 % (w/w) Clsr        ALPRAZolam (XANAX) 0.25 MG tablet Take 1 tablet (0.25 mg total) by mouth 3 (three) times a day as needed for anxiety. 15 tablet 0     norethindrone (ORTHO MICRONOR) 0.35 mg tablet Take 1 tablet (0.35 mg total) by mouth daily. 90 tablet 2     No current facility-administered medications for this visit.      Past Medical History:   Diagnosis Date     SVT (supraventricular tachycardia)      Past Surgical History:   Procedure Laterality Date     CARDIAC ELECTROPHYSIOLOGY MAPPING AND ABLATION  7/6/15    svt ablation     [place]IV[place]      Description:  Section;  Recorded: 2014;     Sulfa (sulfonamide antibiotics)  Family History   Problem Relation Age of Onset     Prostate cancer Father      Social History     Social History     Marital status:      Spouse name: N/A     Number of children: N/A     Years of education: N/A     Occupational History     Not on file.     Social History Main Topics     Smoking status: Never Smoker     Smokeless tobacco: Never Used     Alcohol use No     Drug use: No     Sexual activity: Yes     Partners: Male     Other Topics Concern     Not on file     Social History Narrative       Review of Systems  General:  Denies problems  Eyes:  Denies problems  Ears/Nose/Throat:  Denies problems  Cardiovascular:  Denies problems  Respiratory:  Denies problems  Gastrointestinal:  Denies problems  Genitourinary:  Denies problems  Musculoskeletal:  Denies problems  Skin:  Denies problems, Neurologic:  Denies problems  Psychiatric:  Denies problems  Endocrine:  Denies problems  Heme/Lymphatic:  Denies problems  Allergic/Immunologic:  Denies problems       Objective:         Vitals:    18 0810   BP: 98/60   Pulse: 64   Resp: 12   Temp: 98  F (36.7  C)   TempSrc: Oral   Weight: 115 lb 6 oz (52.3 kg)   Height: 5' 4.5\" (1.638 m)       Physical Exam:  General Appearance: Alert, cooperative, no distress, appears " stated age   Head: Normocephalic, without obvious abnormality, atraumatic  Eyes: PERRL, conjunctiva/corneas clear, EOM's intact   Ears: Normal TM's and external ear canals, both ears  Nose:Nares normal, septum midline,mucosa normal, no drainage    Throat:Lips, mucosa, and tongue normal; teeth and gums normal  Neck: Supple, symmetrical, trachea midline, no adenopathy;  thyroid: not enlarged, symmetric, no tenderness/mass/nodules  Back: Symmetric, no curvature, ROM normal,  Lungs: Clear to auscultation bilaterally, respirations unlabored  Breasts: No breast masses, tenderness, asymmetry, or nipple discharge.  Heart: Regular rate and rhythm, S1 and S2 normal, no murmur, rub, or gallop  Abdomen: Soft, non-tender, bowel sounds active all four quadrants,  no masses, no organomegaly  Extremities: Extremities normal, atraumatic, no cyanosis or edema  Skin: Skin color, texture, turgor normal, no rashes or lesions  Lymph nodes: Cervical, supraclavicular, and axillary nodes normal and   Neurologic: Normal

## 2021-07-09 ENCOUNTER — TRANSFERRED RECORDS (OUTPATIENT)
Dept: HEALTH INFORMATION MANAGEMENT | Facility: CLINIC | Age: 41
End: 2021-07-09

## 2021-08-06 DIAGNOSIS — I47.19 JUNCTIONAL TACHYCARDIA (H): Primary | ICD-10-CM

## 2021-08-06 RX ORDER — ATENOLOL 25 MG/1
TABLET ORAL
Qty: 90 TABLET | Refills: 1 | Status: SHIPPED | OUTPATIENT
Start: 2021-08-06 | End: 2022-02-07

## 2021-08-06 NOTE — TELEPHONE ENCOUNTER
Routing refill request to provider for review/approval because:  Medication is reported/historical    Melvin Oneal RN

## 2021-09-26 ENCOUNTER — HEALTH MAINTENANCE LETTER (OUTPATIENT)
Age: 41
End: 2021-09-26

## 2021-10-11 ENCOUNTER — TRANSFERRED RECORDS (OUTPATIENT)
Dept: HEALTH INFORMATION MANAGEMENT | Facility: CLINIC | Age: 41
End: 2021-10-11

## 2022-03-12 ENCOUNTER — HEALTH MAINTENANCE LETTER (OUTPATIENT)
Age: 42
End: 2022-03-12

## 2022-06-19 ASSESSMENT — ENCOUNTER SYMPTOMS
BREAST MASS: 0
PALPITATIONS: 0
ABDOMINAL PAIN: 0
CHILLS: 0
HEMATURIA: 0
DYSURIA: 0
SORE THROAT: 0
EYE PAIN: 0
JOINT SWELLING: 0
DIZZINESS: 0
PARESTHESIAS: 0
HEADACHES: 0
HEMATOCHEZIA: 0
WEAKNESS: 0
FEVER: 0
COUGH: 0
CONSTIPATION: 0
NAUSEA: 0
FREQUENCY: 0
ARTHRALGIAS: 0
HEARTBURN: 0
SHORTNESS OF BREATH: 0
MYALGIAS: 0
NERVOUS/ANXIOUS: 0
DIARRHEA: 0

## 2022-06-20 ENCOUNTER — OFFICE VISIT (OUTPATIENT)
Dept: FAMILY MEDICINE | Facility: CLINIC | Age: 42
End: 2022-06-20
Payer: COMMERCIAL

## 2022-06-20 VITALS
BODY MASS INDEX: 20.35 KG/M2 | OXYGEN SATURATION: 99 % | SYSTOLIC BLOOD PRESSURE: 100 MMHG | HEART RATE: 52 BPM | HEIGHT: 65 IN | RESPIRATION RATE: 12 BRPM | WEIGHT: 122.13 LBS | TEMPERATURE: 98.7 F | DIASTOLIC BLOOD PRESSURE: 62 MMHG

## 2022-06-20 DIAGNOSIS — K62.5 RECTAL BLEEDING: ICD-10-CM

## 2022-06-20 DIAGNOSIS — I47.19 JUNCTIONAL TACHYCARDIA (H): ICD-10-CM

## 2022-06-20 DIAGNOSIS — Z00.00 HEALTHCARE MAINTENANCE: Primary | ICD-10-CM

## 2022-06-20 DIAGNOSIS — Z11.59 NEED FOR HEPATITIS C SCREENING TEST: ICD-10-CM

## 2022-06-20 DIAGNOSIS — Z11.4 SCREENING FOR HIV (HUMAN IMMUNODEFICIENCY VIRUS): ICD-10-CM

## 2022-06-20 DIAGNOSIS — I47.19 JUNCTIONAL ECTOPIC TACHYCARDIA (H): ICD-10-CM

## 2022-06-20 LAB
ALBUMIN SERPL-MCNC: 3.5 G/DL (ref 3.4–5)
ALP SERPL-CCNC: 54 U/L (ref 40–150)
ALT SERPL W P-5'-P-CCNC: 15 U/L (ref 0–50)
ANION GAP SERPL CALCULATED.3IONS-SCNC: 4 MMOL/L (ref 3–14)
AST SERPL W P-5'-P-CCNC: 14 U/L (ref 0–45)
BILIRUB SERPL-MCNC: 0.4 MG/DL (ref 0.2–1.3)
BUN SERPL-MCNC: 14 MG/DL (ref 7–30)
CALCIUM SERPL-MCNC: 8.6 MG/DL (ref 8.5–10.1)
CHLORIDE BLD-SCNC: 106 MMOL/L (ref 94–109)
CHOLEST SERPL-MCNC: 155 MG/DL
CO2 SERPL-SCNC: 26 MMOL/L (ref 20–32)
CREAT SERPL-MCNC: 0.78 MG/DL (ref 0.52–1.04)
FASTING STATUS PATIENT QL REPORTED: YES
GFR SERPL CREATININE-BSD FRML MDRD: >90 ML/MIN/1.73M2
GLUCOSE BLD-MCNC: 92 MG/DL (ref 70–99)
HDLC SERPL-MCNC: 70 MG/DL
LDLC SERPL CALC-MCNC: 63 MG/DL
NONHDLC SERPL-MCNC: 85 MG/DL
POTASSIUM BLD-SCNC: 4.1 MMOL/L (ref 3.4–5.3)
PROT SERPL-MCNC: 7.4 G/DL (ref 6.8–8.8)
SODIUM SERPL-SCNC: 136 MMOL/L (ref 133–144)
TRIGL SERPL-MCNC: 111 MG/DL
TSH SERPL DL<=0.005 MIU/L-ACNC: 1.86 MU/L (ref 0.4–4)

## 2022-06-20 PROCEDURE — 80053 COMPREHEN METABOLIC PANEL: CPT | Performed by: FAMILY MEDICINE

## 2022-06-20 PROCEDURE — 84443 ASSAY THYROID STIM HORMONE: CPT | Performed by: FAMILY MEDICINE

## 2022-06-20 PROCEDURE — 36415 COLL VENOUS BLD VENIPUNCTURE: CPT | Performed by: FAMILY MEDICINE

## 2022-06-20 PROCEDURE — 86803 HEPATITIS C AB TEST: CPT | Performed by: FAMILY MEDICINE

## 2022-06-20 PROCEDURE — 99396 PREV VISIT EST AGE 40-64: CPT | Performed by: FAMILY MEDICINE

## 2022-06-20 PROCEDURE — 80061 LIPID PANEL: CPT | Performed by: FAMILY MEDICINE

## 2022-06-20 PROCEDURE — 87624 HPV HI-RISK TYP POOLED RSLT: CPT | Performed by: FAMILY MEDICINE

## 2022-06-20 PROCEDURE — 87389 HIV-1 AG W/HIV-1&-2 AB AG IA: CPT | Performed by: FAMILY MEDICINE

## 2022-06-20 PROCEDURE — G0145 SCR C/V CYTO,THINLAYER,RESCR: HCPCS | Performed by: FAMILY MEDICINE

## 2022-06-20 RX ORDER — ATENOLOL 25 MG/1
TABLET ORAL
Qty: 90 TABLET | Refills: 3 | Status: SHIPPED | OUTPATIENT
Start: 2022-06-20 | End: 2023-07-31

## 2022-06-20 NOTE — PROGRESS NOTES
Answers for HPI/ROS submitted by the patient on 6/19/2022  Frequency of exercise:: 2-3 days/week  Getting at least 3 servings of Calcium per day:: Yes  Diet:: Regular (no restrictions)  Taking medications regularly:: Yes  Medication side effects:: None  Bi-annual eye exam:: Yes  Dental care twice a year:: Yes  Sleep apnea or symptoms of sleep apnea:: None  abdominal pain: No  Blood in stool: No  Blood in urine: No  chest pain: No  chills: No  congestion: No  constipation: No  cough: No  diarrhea: No  dizziness: No  ear pain: No  eye pain: No  nervous/anxious: No  fever: No  frequency: No  genital sores: No  headaches: No  hearing loss: No  heartburn: No  arthralgias: No  joint swelling: No  peripheral edema: No  mood changes: No  myalgias: No  nausea: No  dysuria: No  palpitations: No  Skin sensation changes: No  sore throat: No  urgency: No  rash: No  shortness of breath: No  visual disturbance: No  weakness: No  pelvic pain: No  vaginal bleeding: No  vaginal discharge: No  tenderness: No  breast mass: No  breast discharge: No  Additional concerns today:: Yes  Duration of exercise:: 15-30 minutes        Assessment/Plan:     Health maintenance female exam.  All questions answered.  Await pap smear results.  Breast self exam technique reviewed and patient encouraged to perform self-exam monthly.  Discussed healthy lifestyle modifications.  Mammogram ordered.  Await fasting lab results    Healthcare maintenance  Patient would like a Mirena IUD.  I have her scheduled in about a month.  She will let me know if there are any concerns sooner.  Discussed the risks and most common side effects of the IUD.  Discussed risks of insertion.  - REVIEW OF HEALTH MAINTENANCE PROTOCOL ORDERS  - Pap screen with HPV - recommended age 30 - 65 years  - Lipid panel reflex to direct LDL Fasting  - COMPREHENSIVE METABOLIC PANEL  - TSH with free T4 reflex  - Basic metabolic panel  (Ca, Cl, CO2, Creat, Gluc, K, Na, BUN)  - Lipid panel reflex  to direct LDL Fasting  - COMPREHENSIVE METABOLIC PANEL  - TSH with free T4 reflex    Junctional tachycardia (H)  - atenolol (TENORMIN) 25 MG tablet  Dispense: 90 tablet; Refill: 3    Junctional ectopic tachycardia (H)  - atenolol (TENORMIN) 25 MG tablet  Dispense: 90 tablet; Refill: 3    Rectal bleeding  This seems to be fairly minimal but given that this is been going on since January I would like her to follow-up with a colonoscopy to ensure there are no concerns.  Reassurance given that this is most likely at night.  - Adult Gastro Ref - Procedure Only    Screening for HIV (human immunodeficiency virus)  - HIV Antigen Antibody Combo  - HIV Antigen Antibody Combo    Need for hepatitis C screening test  - Hepatitis C Screen Reflex to HCV RNA Quant and Genotype  - Hepatitis C Screen Reflex to HCV RNA Quant and Genotype        Patient has been advised of split billing requirements and indicates understanding: Yes      Subjective:     Mary Anne Sanchez is a 41 year old female who presents for an annual exam.  She is overall doing well.  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    She unfortunately has been noticing some rectal bleeding that picked up over the winter when she began running on her treadmill.  She notes that this is bright red and only with bowel movements.  She does not have any blood in her underwear or between bowel movements.  This does not occur every time.  No pain.  No family history of colon or rectal cancer.  Eating and drinking well.      Healthy Habits:   Regular Exercise: Yes  Sunscreen Use: Yes  Healthy Diet: yes  Dental Visits Regularly: yes  Seat Belt: Yes  Self Breast Exam Monthly: yes  Prevention of Osteoporosis: yes    Immunization History   Administered Date(s) Administered     COVID-19,PF,Moderna 04/01/2021, 04/27/2021, 11/24/2021     FLU 6-35 months 10/09/2015     Flu, Unspecified 10/16/2012, 10/01/2016, 10/19/2017     [id number] Novel Flu  P-free 2009     Influenza (IIV3) PF 10/25/2011, 2013, 2014, 10/07/2016     Influenza Vaccine, 6+MO IM (QUADRIVALENT W/PRESERVATIVES) 10/18/2018, 2019, 10/04/2020     TDAP Vaccine (Boostrix) 2010         Gynecologic History  Patient's last menstrual period was 2022 (exact date).  Contraception: oral contraceptive and vasectomy--current partner  Last Pap: . Results were: normal  Last mammogram: . Results were: normal      OB History   No obstetric history on file.       Current Outpatient Medications   Medication Sig Dispense Refill     adapalene (DIFFERIN) 0.3 % external gel        atenolol (TENORMIN) 25 MG tablet TAKE 1 TABLET (25 MG) BY MOUTH DAILY. DUE FOR AN OFFICE VISIT PRIOR TO FURTHER REFILLS. 90 tablet 3     desogestrel-ethinyl estradiol (KARIVA) 0.15-0.02/0.01 MG () tablet        Multiple Vitamin (MULTI-VITAMIN DAILY PO)        No past medical history on file.  Past Surgical History:   Procedure Laterality Date     CARDIAC ELECTROPHYSIOLOGY MAPPING AND ABLATION  7/6/15    svt ablation     ZZC  DELIVERY ONLY      Description:  Section;  Recorded: 2014;     Sulfa drugs  Family History   Problem Relation Age of Onset     Prostate Cancer Father      Social History     Socioeconomic History     Marital status:      Spouse name: Not on file     Number of children: Not on file     Years of education: Not on file     Highest education level: Not on file   Occupational History     Not on file   Tobacco Use     Smoking status: Never Smoker     Smokeless tobacco: Never Used   Substance and Sexual Activity     Alcohol use: Not on file     Drug use: Not on file     Sexual activity: Not on file   Other Topics Concern     Not on file   Social History Narrative     Not on file     Social Determinants of Health     Financial Resource Strain: Not on file   Food Insecurity: Not on file   Transportation Needs: Not on file   Physical Activity: Not on  "file   Stress: Not on file   Social Connections: Not on file   Intimate Partner Violence: Not on file   Housing Stability: Not on file       Review of Systems  12 point review of systems was completed and found to be negative except for what is been stated above.      Objective:      Vitals:    06/20/22 0845   BP: 100/62   Pulse: 52   Resp: 12   Temp: 98.7  F (37.1  C)   TempSrc: Tympanic   SpO2: 99%   Weight: 55.4 kg (122 lb 2 oz)   Height: 1.638 m (5' 4.5\")         Physical Exam:  General Appearance: Alert, cooperative, no distress, appears stated age   Head: Normocephalic, without obvious abnormality, atraumatic  Eyes: PERRL, conjunctiva/corneas clear, EOM's intact   Ears: Normal TM's and external ear canals, both ears  Neck: Supple, symmetrical, trachea midline, no adenopathy;  thyroid: not enlarged, symmetric, no tenderness/mass/nodules  Back: Symmetric, no curvature, ROM normal,  Lungs: Clear to auscultation bilaterally, respirations unlabored  Breasts: No breast masses, tenderness, asymmetry, or nipple discharge.  Heart: Regular rate and rhythm, S1 and S2 normal, no murmur, rub, or gallop  Abdomen: Soft, non-tender, bowel sounds active all four quadrants,  no masses, no organomegaly  Pelvic:normal external female genitalia, normal appearing vaginal mucosa and cervix  Extremities: Extremities normal, atraumatic, no cyanosis or edema  Skin: Skin color, texture, turgor normal, no rashes or lesions  Lymph nodes: Cervical, supraclavicular, and axillary nodes normal and   Neurologic: Normal     "

## 2022-06-21 LAB
HCV AB SERPL QL IA: NONREACTIVE
HIV 1+2 AB+HIV1 P24 AG SERPL QL IA: NONREACTIVE

## 2022-06-23 LAB
BKR LAB AP GYN ADEQUACY: NORMAL
BKR LAB AP GYN INTERPRETATION: NORMAL
BKR LAB AP HPV REFLEX: NORMAL
BKR LAB AP LMP: NORMAL
BKR LAB AP PREVIOUS ABNORMAL: NORMAL
PATH REPORT.COMMENTS IMP SPEC: NORMAL
PATH REPORT.COMMENTS IMP SPEC: NORMAL
PATH REPORT.RELEVANT HX SPEC: NORMAL

## 2022-06-24 LAB
HUMAN PAPILLOMA VIRUS 16 DNA: NEGATIVE
HUMAN PAPILLOMA VIRUS 18 DNA: NEGATIVE
HUMAN PAPILLOMA VIRUS FINAL DIAGNOSIS: NORMAL
HUMAN PAPILLOMA VIRUS OTHER HR: NEGATIVE

## 2022-07-18 PROBLEM — M19.90 ARTHRITIS: Status: ACTIVE | Noted: 2021-07-06

## 2022-07-19 ENCOUNTER — OFFICE VISIT (OUTPATIENT)
Dept: FAMILY MEDICINE | Facility: CLINIC | Age: 42
End: 2022-07-19
Payer: COMMERCIAL

## 2022-07-19 VITALS
HEIGHT: 65 IN | SYSTOLIC BLOOD PRESSURE: 100 MMHG | BODY MASS INDEX: 20.08 KG/M2 | RESPIRATION RATE: 12 BRPM | TEMPERATURE: 98.8 F | WEIGHT: 120.5 LBS | HEART RATE: 54 BPM | DIASTOLIC BLOOD PRESSURE: 68 MMHG | OXYGEN SATURATION: 100 %

## 2022-07-19 DIAGNOSIS — Z30.430 ENCOUNTER FOR IUD INSERTION: Primary | ICD-10-CM

## 2022-07-19 LAB — HCG UR QL: NEGATIVE

## 2022-07-19 PROCEDURE — 81025 URINE PREGNANCY TEST: CPT | Performed by: FAMILY MEDICINE

## 2022-07-19 PROCEDURE — 58300 INSERT INTRAUTERINE DEVICE: CPT | Performed by: FAMILY MEDICINE

## 2022-07-19 PROCEDURE — 99207 PR DROP WITH A PROCEDURE: CPT | Performed by: FAMILY MEDICINE

## 2022-07-19 NOTE — PROGRESS NOTES
Answers for HPI/ROS submitted by the patient on 7/19/2022  What is the reason for your visit today? : IUD  How many servings of fruits and vegetables do you eat daily?: 2-3  On average, how many sweetened beverages do you drink each day (Examples: soda, juice, sweet tea, etc.  Do NOT count diet or artificially sweetened beverages)?: 0  How many minutes a day do you exercise enough to make your heart beat faster?: 20 to 29  How many days a week do you exercise enough to make your heart beat faster?: 4  How many days per week do you miss taking your medication?: 0    IUD Insertion Procedure Note    Pre-operative Diagnosis: menorrhagia    Post-operative Diagnosis: menorrhagia    Indications: menorrhagia    Procedure Details   Urine pregnancy test was done today and result was negative.  The risks (including infection, bleeding, pain, and uterine perforation) and benefits of the procedure were explained to the patient and Written informed consent was obtained.      Cervix cleansed with Betadine. Uterus sounded to 7.5 cm. IUD inserted without difficulty under sterile techniquw. String visible and trimmed. Patient tolerated procedure well.    IUD Information:  Mirena.    Condition:  Stable    Complications:  None    Plan:    The patient was advised to call for any fever or for prolonged or severe pain or bleeding. She was advised to use NSAID as needed for mild to moderate pain. '

## 2022-08-01 ENCOUNTER — TRANSFERRED RECORDS (OUTPATIENT)
Dept: HEALTH INFORMATION MANAGEMENT | Facility: CLINIC | Age: 42
End: 2022-08-01

## 2023-03-06 ENCOUNTER — TRANSFERRED RECORDS (OUTPATIENT)
Dept: HEALTH INFORMATION MANAGEMENT | Facility: CLINIC | Age: 43
End: 2023-03-06

## 2023-03-27 ENCOUNTER — E-VISIT (OUTPATIENT)
Dept: URGENT CARE | Facility: CLINIC | Age: 43
End: 2023-03-27
Payer: COMMERCIAL

## 2023-03-27 DIAGNOSIS — J01.90 ACUTE SINUSITIS WITH SYMPTOMS > 10 DAYS: Primary | ICD-10-CM

## 2023-03-27 PROCEDURE — 99421 OL DIG E/M SVC 5-10 MIN: CPT | Performed by: NURSE PRACTITIONER

## 2023-03-27 NOTE — PATIENT INSTRUCTIONS
Sinusitis (Antibiotic Treatment)    The sinuses are air-filled spaces within the bones of the face. They connect to the inside of the nose. Sinusitis is an inflammation of the tissue that lines the sinuses. Sinusitis can occur during a cold. It can also happen due to allergies to pollens and other particles in the air. Sinusitis can cause symptoms of sinus congestion and a feeling of fullness. A sinus infection causes fever, headache, and facial pain. There is often green or yellow fluid draining from the nose or into the back of the throat (post-nasal drip). You have been given antibiotics to treat this condition.   Home care    Take the full course of antibiotics as instructed. Don't stop taking them, even when you feel better.    Drink plenty of water, hot tea, and other liquids as directed by the healthcare provider. This may help thin nasal mucus. It also may help your sinuses drain fluids.    Heat may help soothe painful areas of your face. Use a towel soaked in hot water. Or,  the shower and direct the warm spray onto your face. Using a vaporizer along with a menthol rub at night may also help soothe symptoms.     An expectorant with guaifenesin may help thin nasal mucus and help your sinuses drain fluids. Talk with your provider or pharmacists before taking an over-the-counter (OTC) medicine if you have any questions about it or its side effects..    You can use an OTC decongestant, unless a similar medicine was prescribed to you. Nasal sprays work the fastest. Use one that contains phenylephrine or oxymetazoline. First blow your nose gently. Then use the spray. Don't use these medicines more often than directed on the label. If you do, your symptoms may get worse. You may also take pills that contain pseudoephedrine. Don t use products that combine multiple medicines. This is because side effects may be increased. Read labels. You can also ask the pharmacist for help. (People with high blood  pressure should not use decongestants. They can raise blood pressure.) Talk with your provider or pharmacist if you have any questions about the medicine..    OTC antihistamines may help if allergies contributed to your sinusitis. Talk with your provider or pharmacist if you have any questions about the medicine.    Nasal rinses or irrigation may help symptoms. It's very important to use these products only as directed. Use sterile water or sterile saline solution and not tap water. Tap water may contain germs that can cause infection in the brain. Don't rinse with excess pressure. this may spread the infection to other areas in your sinuses or head. Ask your healthcare provider or pharmacist if you have questions about using these products.    Use acetaminophen, naproxen, or ibuprofen to control pain, unless another pain medicine was prescribed to you. Talk with your healthcare provider before using these medicines if you have chronic liver or kidney disease or ever had a stomach ulcer. Never give aspirin to anyone under age 18 without first talking to their healthcare provider. It may cause severe liver damage.    Don't smoke. This can make symptoms worse.    Follow-up care  Follow up with your healthcare provider as advised.   When to seek medical advice  Call your healthcare provider if any of these occur:     Facial pain or headache that gets worse    Symptoms don't go away in 10 days    Fever of 100.4 F (38 C) or higher, or as directed by your healthcare provider  Call 911  Call 911 if any of these occur:     Seizure    Trouble breathing    Feeling dizzy or faint    Fingernails, skin, or lips look blue, purple, or gray    Severe headache that doesn't go away    Stiff neck    Unusual drowsiness or confusion    Vision problems such as blurred or double vision    Swelling of your forehead or eyelids  Prevention  Here are steps you can take to help prevent an infection:     Keep good hand washing habits.    Don t  have close contact with people who have sore throats, colds, or other upper respiratory infections.    Don t smoke, and stay away from secondhand smoke.    Stay up to date with all of your vaccines.  [employer] last reviewed this educational content on 1/1/2022 2000-2022 [employer]. All rights reserved. This information is not intended as a substitute for professional medical care. Always follow your healthcare professional's instructions.        Dear Mary Anne Sanchez    After reviewing your responses, I've been able to diagnose you with Acute sinusitis with symptoms > 10 days.      Based on your responses and diagnosis, I have prescribed   Orders Placed This Encounter     amoxicillin-clavulanate (AUGMENTIN) 875-125 MG tablet    to treat your symptoms. I have sent this to your pharmacy.?     It is also important to stay well hydrated, get lots of rest and take over-the-counter decongestants,?tylenol?or ibuprofen if you?are able to?take those medications per your primary care provider to help relieve discomfort.?     It is important that you take?all of?your prescribed medication even if your symptoms are improving after a few doses.? Taking?all of?your medicine helps prevent the symptoms from returning.?     If your symptoms worsen, you develop severe headache, vomiting, high fever (>102), or are not improving in 7 days, please contact your primary care provider for an appointment or visit any of our convenient Walk-in Care or Urgent Care Centers to be seen which can be found on our website?here.?     Thanks again for choosing?us?as your health care partner,?   ?  LYUBOV Fernandes CNP?

## 2023-04-03 ENCOUNTER — OFFICE VISIT (OUTPATIENT)
Dept: FAMILY MEDICINE | Facility: CLINIC | Age: 43
End: 2023-04-03
Payer: COMMERCIAL

## 2023-04-03 VITALS
BODY MASS INDEX: 20.66 KG/M2 | WEIGHT: 124 LBS | TEMPERATURE: 99.3 F | HEART RATE: 60 BPM | OXYGEN SATURATION: 98 % | HEIGHT: 65 IN | DIASTOLIC BLOOD PRESSURE: 68 MMHG | RESPIRATION RATE: 12 BRPM | SYSTOLIC BLOOD PRESSURE: 102 MMHG

## 2023-04-03 DIAGNOSIS — J32.9 SINUSITIS, UNSPECIFIED CHRONICITY, UNSPECIFIED LOCATION: Primary | ICD-10-CM

## 2023-04-03 PROBLEM — K62.5 HEMORRHAGE OF RECTUM AND ANUS: Status: ACTIVE | Noted: 2022-08-03

## 2023-04-03 PROBLEM — K64.9 HEMORRHOIDS: Status: ACTIVE | Noted: 2022-08-01

## 2023-04-03 PROCEDURE — 99214 OFFICE O/P EST MOD 30 MIN: CPT | Performed by: FAMILY MEDICINE

## 2023-04-03 RX ORDER — DOXYCYCLINE 100 MG/1
100 CAPSULE ORAL 2 TIMES DAILY
Qty: 14 CAPSULE | Refills: 0 | Status: SHIPPED | OUTPATIENT
Start: 2023-04-03 | End: 2023-11-03

## 2023-04-03 NOTE — PROGRESS NOTES
Answers for HPI/ROS submitted by the patient on 4/3/2023  What is the reason for your visit today? : sinus infection  How many servings of fruits and vegetables do you eat daily?: 2-3  On average, how many sweetened beverages do you drink each day (Examples: soda, juice, sweet tea, etc.  Do NOT count diet or artificially sweetened beverages)?: 0  How many minutes a day do you exercise enough to make your heart beat faster?: 20 to 29  How many days a week do you exercise enough to make your heart beat faster?: 5  How many days per week do you miss taking your medication?: 0    Assessment/Plan:    Mary Anne Sanchez is a 42 year old female presenting for:    Sinusitis, unspecified chronicity, unspecified location  Doxycycline sent to the pharmacy.  Discussed risks and benefits of the medication.  Discussed that she could use Afrin for a few days again to see if this is helpful.  She will continue using the Flonase.  Discussed that she can use ibuprofen 3 times daily on a scheduled basis for the next 7 to 10 days as well.  She will contact me next week to let me know how she is doing.    If not improved I would have her see ENT again.  - doxycycline hyclate (VIBRAMYCIN) 100 MG capsule  Dispense: 14 capsule; Refill: 0      There are no discontinued medications.        Chief Complaint:  Sinus Problem        Subjective:   Mary Anne Sanchez is a very pleasant 32-year-old female who presents to the clinic today for follow-up of serous otitis and a sinus infection patient was back at the end of January early February she was diagnosed with serous otitis.  She was told to take Afrin and use Sudafed..    She states that she thinks that this may be helped slightly but she continued to have pressure and pain in her ears.  She had an ENT appointment at the end of February and was told her ears look normal.  She was given prednisone which she states made her feel fairly unwell.  She states that she does not think that it  "really helped her symptoms.    She then started to get some sinus pressure.  She did an E-visit and was given Augmentin.  She took this for 7 days and states that it helped some of the green rhinorrhea but she continues to have sinus pressure and continues to have bilateral ear pressure.  She states that she has had to stop running due to the ear concerns.  Likely she does not feel dizzy.    She has not really had fevers.  She continues to have frontal and maxillary sinus pressure particularly in the morning.    12 point review of systems completed and negative except for what has been described above    History   Smoking Status     Never   Smokeless Tobacco     Never         Current Outpatient Medications:      adapalene (DIFFERIN) 0.3 % external gel, , Disp: , Rfl:      atenolol (TENORMIN) 25 MG tablet, TAKE 1 TABLET (25 MG) BY MOUTH DAILY. DUE FOR AN OFFICE VISIT PRIOR TO FURTHER REFILLS., Disp: 90 tablet, Rfl: 3     doxycycline hyclate (VIBRAMYCIN) 100 MG capsule, Take 1 capsule (100 mg) by mouth 2 times daily, Disp: 14 capsule, Rfl: 0     levonorgestrel (MIRENA) 20 MCG/DAY IUD, by Intrauterine route once, Disp: , Rfl:      Multiple Vitamin (MULTI-VITAMIN DAILY PO), , Disp: , Rfl:      amoxicillin-clavulanate (AUGMENTIN) 875-125 MG tablet, Take 1 tablet by mouth 2 times daily for 7 days (Patient not taking: Reported on 4/3/2023), Disp: 14 tablet, Rfl: 0     desogestrel-ethinyl estradiol (KARIVA) 0.15-0.02/0.01 MG (21/5) tablet, , Disp: , Rfl:       Objective:  Vitals:    04/03/23 1606   BP: 102/68   Pulse: 60   Resp: 12   Temp: 99.3  F (37.4  C)   TempSrc: Tympanic   SpO2: 98%   Weight: 56.2 kg (124 lb)   Height: 1.638 m (5' 4.5\")       Body mass index is 20.96 kg/m .    Vital signs reviewed and stable  General: No acute distress  Psych: Appropriate affect  HEENT: moist mucous membranes, pupils equal, round, reactive to light and accomodation, tympanic membranes are pearly grey bilaterally  Lymph: no cervical or " supraclavicular lymphadenopathy  Cardiovascular: regular rate and rhythm with no murmur  Pulmonary: clear to auscultation bilaterally with no wheeze  Abdomen: soft, non tender, non distended with normo-active bowel sounds  Extremities: warm and well perfused with no edema  Skin: warm and dry with no rash         This note has been dictated and transcribed using voice recognition software.   Any errors in transcription are unintentional and inherent to the software.

## 2023-04-23 ENCOUNTER — HEALTH MAINTENANCE LETTER (OUTPATIENT)
Age: 43
End: 2023-04-23

## 2023-04-28 ENCOUNTER — TRANSFERRED RECORDS (OUTPATIENT)
Dept: HEALTH INFORMATION MANAGEMENT | Facility: CLINIC | Age: 43
End: 2023-04-28
Payer: COMMERCIAL

## 2023-05-16 ENCOUNTER — TRANSFERRED RECORDS (OUTPATIENT)
Dept: HEALTH INFORMATION MANAGEMENT | Facility: CLINIC | Age: 43
End: 2023-05-16
Payer: COMMERCIAL

## 2023-06-13 ENCOUNTER — PATIENT OUTREACH (OUTPATIENT)
Dept: CARE COORDINATION | Facility: CLINIC | Age: 43
End: 2023-06-13
Payer: COMMERCIAL

## 2023-06-27 ENCOUNTER — PATIENT OUTREACH (OUTPATIENT)
Dept: CARE COORDINATION | Facility: CLINIC | Age: 43
End: 2023-06-27
Payer: COMMERCIAL

## 2023-07-10 ENCOUNTER — ANCILLARY ORDERS (OUTPATIENT)
Dept: FAMILY MEDICINE | Facility: CLINIC | Age: 43
End: 2023-07-10

## 2023-07-10 DIAGNOSIS — Z12.31 VISIT FOR SCREENING MAMMOGRAM: ICD-10-CM

## 2023-07-24 ENCOUNTER — ANCILLARY ORDERS (OUTPATIENT)
Dept: RADIOLOGY | Facility: CLINIC | Age: 43
End: 2023-07-24

## 2023-07-24 ENCOUNTER — ANCILLARY PROCEDURE (OUTPATIENT)
Dept: MAMMOGRAPHY | Facility: HOSPITAL | Age: 43
End: 2023-07-24
Attending: FAMILY MEDICINE
Payer: COMMERCIAL

## 2023-07-24 ENCOUNTER — ANCILLARY ORDERS (OUTPATIENT)
Dept: FAMILY MEDICINE | Facility: CLINIC | Age: 43
End: 2023-07-24

## 2023-07-24 DIAGNOSIS — Z12.31 VISIT FOR SCREENING MAMMOGRAM: ICD-10-CM

## 2023-07-24 PROCEDURE — 77067 SCR MAMMO BI INCL CAD: CPT

## 2023-07-31 DIAGNOSIS — I47.19 JUNCTIONAL TACHYCARDIA (H): ICD-10-CM

## 2023-07-31 DIAGNOSIS — I47.19 JUNCTIONAL ECTOPIC TACHYCARDIA (H): ICD-10-CM

## 2023-07-31 RX ORDER — ATENOLOL 25 MG/1
TABLET ORAL
Qty: 90 TABLET | Refills: 1 | Status: SHIPPED | OUTPATIENT
Start: 2023-07-31 | End: 2024-01-29

## 2023-07-31 NOTE — TELEPHONE ENCOUNTER
"Routing refill request to provider for review/approval because:  Patient needs to be seen because:  Due far annual physical. Has appointment scheduled for 11/3/23.                Requested Prescriptions   Pending Prescriptions Disp Refills     atenolol (TENORMIN) 25 MG tablet [Pharmacy Med Name: ATENOLOL 25MG TABS] 90 tablet 3     Sig: TAKE ONE TABLET BY MOUTH ONCE DAILY       Beta-Blockers Protocol Passed - 7/31/2023  9:20 AM        Passed - Blood pressure under 140/90 in past 12 months     BP Readings from Last 3 Encounters:   04/03/23 102/68   07/19/22 100/68   06/20/22 100/62                 Passed - Patient is age 6 or older        Passed - Recent (12 mo) or future (30 days) visit within the authorizing provider's specialty     Patient has had an office visit with the authorizing provider or a provider within the authorizing providers department within the previous 12 mos or has a future within next 30 days. See \"Patient Info\" tab in inbasket, or \"Choose Columns\" in Meds & Orders section of the refill encounter.              Passed - Medication is active on med list                 Marcin Yin RN 07/31/23 2:49 PM  "

## 2023-09-23 ENCOUNTER — HEALTH MAINTENANCE LETTER (OUTPATIENT)
Age: 43
End: 2023-09-23

## 2023-11-01 RX ORDER — DAPSONE 50 MG/G
GEL TOPICAL
COMMUNITY
End: 2023-11-03

## 2023-11-03 ENCOUNTER — OFFICE VISIT (OUTPATIENT)
Dept: FAMILY MEDICINE | Facility: CLINIC | Age: 43
End: 2023-11-03
Payer: COMMERCIAL

## 2023-11-03 VITALS
DIASTOLIC BLOOD PRESSURE: 64 MMHG | WEIGHT: 119.25 LBS | SYSTOLIC BLOOD PRESSURE: 98 MMHG | BODY MASS INDEX: 19.87 KG/M2 | HEIGHT: 65 IN | RESPIRATION RATE: 12 BRPM | TEMPERATURE: 98.2 F | HEART RATE: 60 BPM | OXYGEN SATURATION: 98 %

## 2023-11-03 DIAGNOSIS — I47.10 SUPRAVENTRICULAR TACHYCARDIA (H): ICD-10-CM

## 2023-11-03 DIAGNOSIS — Z00.00 ROUTINE GENERAL MEDICAL EXAMINATION AT A HEALTH CARE FACILITY: Primary | ICD-10-CM

## 2023-11-03 PROCEDURE — 99396 PREV VISIT EST AGE 40-64: CPT | Performed by: FAMILY MEDICINE

## 2023-11-03 ASSESSMENT — ENCOUNTER SYMPTOMS
FREQUENCY: 0
HEMATURIA: 0
CONSTIPATION: 0
HEARTBURN: 0
NAUSEA: 0
DIZZINESS: 0
COUGH: 0
SORE THROAT: 0
WEAKNESS: 0
NERVOUS/ANXIOUS: 0
ABDOMINAL PAIN: 0
MYALGIAS: 0
JOINT SWELLING: 0
HEADACHES: 0
PARESTHESIAS: 0
SHORTNESS OF BREATH: 0
DIARRHEA: 0
ARTHRALGIAS: 0
EYE PAIN: 0
PALPITATIONS: 0
FEVER: 0
CHILLS: 0
DYSURIA: 0
BREAST MASS: 0
HEMATOCHEZIA: 0

## 2023-11-05 PROBLEM — I47.10 SUPRAVENTRICULAR TACHYCARDIA (H): Status: ACTIVE | Noted: 2023-11-05

## 2024-01-28 DIAGNOSIS — I47.19 JUNCTIONAL ECTOPIC TACHYCARDIA (H): ICD-10-CM

## 2024-01-28 DIAGNOSIS — I47.19 JUNCTIONAL TACHYCARDIA (H): ICD-10-CM

## 2024-01-29 RX ORDER — ATENOLOL 25 MG/1
TABLET ORAL
Qty: 90 TABLET | Refills: 2 | Status: SHIPPED | OUTPATIENT
Start: 2024-01-29

## 2024-03-07 ENCOUNTER — E-VISIT (OUTPATIENT)
Dept: FAMILY MEDICINE | Facility: CLINIC | Age: 44
End: 2024-03-07
Payer: COMMERCIAL

## 2024-03-07 DIAGNOSIS — F40.243 ANXIETY WITH FLYING: Primary | ICD-10-CM

## 2024-03-07 PROCEDURE — 99421 OL DIG E/M SVC 5-10 MIN: CPT | Performed by: FAMILY MEDICINE

## 2024-03-07 RX ORDER — ALPRAZOLAM 0.25 MG
.25-.5 TABLET ORAL 3 TIMES DAILY PRN
Qty: 15 TABLET | Refills: 0 | Status: SHIPPED | OUTPATIENT
Start: 2024-03-07

## 2024-03-07 NOTE — PATIENT INSTRUCTIONS
Thank you for choosing us for your care. I have placed an order for a prescription so that you can start treatment. View your full visit summary for details by clicking on the link below. Your pharmacist will able to address any questions you may have about the medication.     If you're not feeling better within 5-7 days, please schedule an appointment.  You can schedule an appointment right here in [facility], or call [phone]  If the visit is for the same symptoms as your eVisit, we'll refund the cost of your eVisit if seen within seven days.

## 2024-12-04 ENCOUNTER — OFFICE VISIT (OUTPATIENT)
Dept: FAMILY MEDICINE | Facility: CLINIC | Age: 44
End: 2024-12-04
Payer: COMMERCIAL

## 2024-12-04 VITALS
BODY MASS INDEX: 20.35 KG/M2 | WEIGHT: 122.13 LBS | HEIGHT: 65 IN | HEART RATE: 61 BPM | RESPIRATION RATE: 12 BRPM | DIASTOLIC BLOOD PRESSURE: 68 MMHG | TEMPERATURE: 98.6 F | OXYGEN SATURATION: 100 % | SYSTOLIC BLOOD PRESSURE: 110 MMHG

## 2024-12-04 DIAGNOSIS — Z12.31 VISIT FOR SCREENING MAMMOGRAM: ICD-10-CM

## 2024-12-04 DIAGNOSIS — Z00.00 ROUTINE GENERAL MEDICAL EXAMINATION AT A HEALTH CARE FACILITY: Primary | ICD-10-CM

## 2024-12-04 DIAGNOSIS — L60.0 INGROWN TOENAIL: ICD-10-CM

## 2024-12-04 LAB
ANION GAP SERPL CALCULATED.3IONS-SCNC: 9 MMOL/L (ref 7–15)
BUN SERPL-MCNC: 19.3 MG/DL (ref 6–20)
CALCIUM SERPL-MCNC: 9.1 MG/DL (ref 8.8–10.4)
CHLORIDE SERPL-SCNC: 103 MMOL/L (ref 98–107)
CHOLEST SERPL-MCNC: 143 MG/DL
CREAT SERPL-MCNC: 0.74 MG/DL (ref 0.51–0.95)
EGFRCR SERPLBLD CKD-EPI 2021: >90 ML/MIN/1.73M2
ERYTHROCYTE [DISTWIDTH] IN BLOOD BY AUTOMATED COUNT: 11.8 % (ref 10–15)
FASTING STATUS PATIENT QL REPORTED: YES
FASTING STATUS PATIENT QL REPORTED: YES
GLUCOSE SERPL-MCNC: 99 MG/DL (ref 70–99)
HCO3 SERPL-SCNC: 25 MMOL/L (ref 22–29)
HCT VFR BLD AUTO: 37.2 % (ref 35–47)
HDLC SERPL-MCNC: 59 MG/DL
HGB BLD-MCNC: 13.5 G/DL (ref 11.7–15.7)
LDLC SERPL CALC-MCNC: 74 MG/DL
MCH RBC QN AUTO: 31.3 PG (ref 26.5–33)
MCHC RBC AUTO-ENTMCNC: 36.3 G/DL (ref 31.5–36.5)
MCV RBC AUTO: 86 FL (ref 78–100)
NONHDLC SERPL-MCNC: 84 MG/DL
PLATELET # BLD AUTO: 239 10E3/UL (ref 150–450)
POTASSIUM SERPL-SCNC: 4.9 MMOL/L (ref 3.4–5.3)
RBC # BLD AUTO: 4.31 10E6/UL (ref 3.8–5.2)
SODIUM SERPL-SCNC: 137 MMOL/L (ref 135–145)
TRIGL SERPL-MCNC: 50 MG/DL
WBC # BLD AUTO: 6.3 10E3/UL (ref 4–11)

## 2024-12-04 PROCEDURE — 36415 COLL VENOUS BLD VENIPUNCTURE: CPT | Performed by: FAMILY MEDICINE

## 2024-12-04 PROCEDURE — 80061 LIPID PANEL: CPT | Performed by: FAMILY MEDICINE

## 2024-12-04 PROCEDURE — 99396 PREV VISIT EST AGE 40-64: CPT | Performed by: FAMILY MEDICINE

## 2024-12-04 PROCEDURE — 85027 COMPLETE CBC AUTOMATED: CPT | Performed by: FAMILY MEDICINE

## 2024-12-04 PROCEDURE — 80048 BASIC METABOLIC PNL TOTAL CA: CPT | Performed by: FAMILY MEDICINE

## 2024-12-04 SDOH — HEALTH STABILITY: PHYSICAL HEALTH: ON AVERAGE, HOW MANY MINUTES DO YOU ENGAGE IN EXERCISE AT THIS LEVEL?: 20 MIN

## 2024-12-04 SDOH — HEALTH STABILITY: PHYSICAL HEALTH: ON AVERAGE, HOW MANY DAYS PER WEEK DO YOU ENGAGE IN MODERATE TO STRENUOUS EXERCISE (LIKE A BRISK WALK)?: 3 DAYS

## 2024-12-04 ASSESSMENT — SOCIAL DETERMINANTS OF HEALTH (SDOH): HOW OFTEN DO YOU GET TOGETHER WITH FRIENDS OR RELATIVES?: ONCE A WEEK

## 2024-12-04 NOTE — PATIENT INSTRUCTIONS
Patient Education   Preventive Care Advice   This is general advice given by our system to help you stay healthy. However, your care team may have specific advice just for you. Please talk to your care team about your preventive care needs.  Nutrition  Eat 5 or more servings of fruits and vegetables each day.  Try wheat bread, brown rice and whole grain pasta (instead of white bread, rice, and pasta).  Get enough calcium and vitamin D. Check the label on foods and aim for 100% of the RDA (recommended daily allowance).  Lifestyle  Exercise at least 150 minutes each week  (30 minutes a day, 5 days a week).  Do muscle strengthening activities 2 days a week. These help control your weight and prevent disease.  No smoking.  Wear sunscreen to prevent skin cancer.  Have a dental exam and cleaning every 6 months.  Yearly exams  See your health care team every year to talk about:  Any changes in your health.  Any medicines your care team has prescribed.  Preventive care, family planning, and ways to prevent chronic diseases.  Shots (vaccines)   HPV shots (up to age 26), if you've never had them before.  Hepatitis B shots (up to age 59), if you've never had them before.  COVID-19 shot: Get this shot when it's due.  Flu shot: Get a flu shot every year.  Tetanus shot: Get a tetanus shot every 10 years.  Pneumococcal, hepatitis A, and RSV shots: Ask your care team if you need these based on your risk.  Shingles shot (for age 50 and up)  General health tests  Diabetes screening:  Starting at age 35, Get screened for diabetes at least every 3 years.  If you are younger than age 35, ask your care team if you should be screened for diabetes.  Cholesterol test: At age 39, start having a cholesterol test every 5 years, or more often if advised.  Bone density scan (DEXA): At age 50, ask your care team if you should have this scan for osteoporosis (brittle bones).  Hepatitis C: Get tested at least once in your life.  STIs (sexually  transmitted infections)  Before age 24: Ask your care team if you should be screened for STIs.  After age 24: Get screened for STIs if you're at risk. You are at risk for STIs (including HIV) if:  You are sexually active with more than one person.  You don't use condoms every time.  You or a partner was diagnosed with a sexually transmitted infection.  If you are at risk for HIV, ask about PrEP medicine to prevent HIV.  Get tested for HIV at least once in your life, whether you are at risk for HIV or not.  Cancer screening tests  Cervical cancer screening: If you have a cervix, begin getting regular cervical cancer screening tests starting at age 21.  Breast cancer scan (mammogram): If you've ever had breasts, begin having regular mammograms starting at age 40. This is a scan to check for breast cancer.  Colon cancer screening: It is important to start screening for colon cancer at age 45.  Have a colonoscopy test every 10 years (or more often if you're at risk) Or, ask your provider about stool tests like a FIT test every year or Cologuard test every 3 years.  To learn more about your testing options, visit:   .  For help making a decision, visit:   https://bit.ly/dg77834.  Prostate cancer screening test: If you have a prostate, ask your care team if a prostate cancer screening test (PSA) at age 55 is right for you.  Lung cancer screening: If you are a current or former smoker ages 50 to 80, ask your care team if ongoing lung cancer screenings are right for you.  For informational purposes only. Not to replace the advice of your health care provider. Copyright   2023 [employer]. All rights reserved. Clinically reviewed by the [facility] Transitions Program. [employer] 643512 - REV 01/24.

## 2024-12-04 NOTE — PROGRESS NOTES
Preventive Care Visit  [facility] [place] ROBINSON Vang MD, Family Medicine  Dec 4, 2024      Assessment & Plan     Routine general medical examination at a health care facility  Patient notes that she gets some breast tenderness with her cycles.  Discussed that the IUD is likely helping with her heavy menstrual cycles but can she continues to have some hormonal fluctuations as well.  Discussed that she could consider an ablation if she does not want to have the IUD which will help with her heavy menstrual cycles but would likely not help with her other hormonal symptoms.  She will consider and let me know if she would like a referral to the gynecologist.  - REVIEW OF HEALTH MAINTENANCE PROTOCOL ORDERS  - PRIMARY CARE FOLLOW-UP SCHEDULING; Future  - Lipid panel reflex to direct LDL Fasting; Future  - BASIC METABOLIC PANEL; Future  - CBC with Platelets; Future  - Lipid panel reflex to direct LDL Fasting  - BASIC METABOLIC PANEL  - CBC with Platelets    Visit for screening mammogram  Referral placed  - MA Screen Bilateral w/Jorge; Future    Ingrown toenail  Right great toe.  Does not appear infected today.  Discussed gently trimming back or filing the edge of her toenail.  Discussed if this gets inflamed again she can do soaks a few times daily and use hydrocortisone and antibiotic ointment to the area.            Counseling  Appropriate preventive services were addressed with this patient via screening, questionnaire, or discussion as appropriate for fall prevention, nutrition, physical activity, Tobacco-use cessation, social engagement, weight loss and cognition.  Checklist reviewing preventive services available has been given to the patient.  Reviewed patient's diet, addressing concerns and/or questions.   She is at risk for lack of exercise and has been provided with information to increase physical activity for the benefit of her well-being.           Gabbi North is a 44 year old,  presenting for the following:  Physical (Fasting for labs today- hormone concerns)         HPI  She is overall doing well.  She is a  and her class has been good this year.    Has a Mirena IUD which she had placed in the summer 2022.  She has some intermittent spotting.  Continues to have breast tenderness with menstrual cycles.    Does not want to be on a combined contraceptive as the estrogen caused an increase in anxiety       Health Care Directive  Patient does not have a Health Care Directive: Discussed advance care planning with patient; however, patient declined at this time.      12/4/2024   General Health   How would you rate your overall physical health? Good   Feel stress (tense, anxious, or unable to sleep) Not at all            12/4/2024   Nutrition   Three or more servings of calcium each day? Yes   Diet: Regular (no restrictions)   How many servings of fruit and vegetables per day? (!) 2-3   How many sweetened beverages each day? 0-1            12/4/2024   Exercise   Days per week of moderate/strenous exercise 3 days   Average minutes spent exercising at this level 20 min            12/4/2024   Social Factors   Frequency of gathering with friends or relatives Once a week   Worry food won't last until get money to buy more No   Food not last or not have enough money for food? No   Do you have housing? (Housing is defined as stable permanent housing and does not include staying ouside in a car, in a tent, in an abandoned building, in an overnight shelter, or couch-surfing.) Yes   Are you worried about losing your housing? No   Lack of transportation? No   Unable to get utilities (heat,electricity)? No            12/4/2024   Dental   Dentist two times every year? Yes            12/4/2024   TB Screening   Were you born outside of the [place]? No            Today's PHQ-2 Score:       12/4/2024     7:14 AM   PHQ-2 ( 1999 [employer])   Q1: Little interest or pleasure in doing things 0    Q2:  Feeling down, depressed or hopeless 0    PHQ-2 Score 0    Q1: Little interest or pleasure in doing things Not at all   Q2: Feeling down, depressed or hopeless Not at all   PHQ-2 Score 0       Patient-reported           12/4/2024   Substance Use   Alcohol more than 3/day or more than 7/wk Not Applicable   Do you use any other substances recreationally? No        Social History     Tobacco Use    Smoking status: Never    Smokeless tobacco: Never           7/24/2023   LAST FHS-7 RESULTS   1st degree relative breast or ovarian cancer No   Any relative bilateral breast cancer No   Any male have breast cancer No   Any ONE woman have BOTH breast AND ovarian cancer No   Any woman with breast cancer before 50yrs No   2 or more relatives with breast AND/OR ovarian cancer No   2 or more relatives with breast AND/OR bowel cancer No           Mammogram Screening - Mammogram every 1-2 years updated in Health Maintenance based on mutual decision making        12/4/2024   STI Screening   New sexual partner(s) since last STI/HIV test? No        History of abnormal Pap smear: No - age 30- 64 PAP with HPV every 5 years recommended        Latest Ref Rng & Units 6/20/2022     8:50 AM   PAP / HPV   PAP  Negative for Intraepithelial Lesion or Malignancy (NILM)    HPV 16 DNA Negative Negative    HPV 18 DNA Negative Negative    Other HR HPV Negative Negative      ASCVD Risk   The 10-year ASCVD risk score (Brianna PRINCE, et al., 2019) is: 0.2%    Values used to calculate the score:      Age: 44 years      Sex: Female      Is Non- : No      Diabetic: No      Tobacco smoker: No      Systolic Blood Pressure: 110 mmHg      Is BP treated: No      HDL Cholesterol: 70 mg/dL      Total Cholesterol: 155 mg/dL        12/4/2024   Contraception/Family Planning   Questions about contraception or family planning No           Reviewed and updated as needed this visit by Provider                    No past medical history on  "file.  Past Surgical History:   Procedure Laterality Date    CARDIAC ELECTROPHYSIOLOGY MAPPING AND ABLATION  7/6/15    svt ablation    ZZC  DELIVERY ONLY      Description:  Section;  Recorded: 2014;         Review of Systems  Constitutional, HEENT, cardiovascular, pulmonary, GI, , musculoskeletal, neuro, skin, endocrine and psych systems are negative, except as otherwise noted.     Objective    Exam  /68   Pulse 61   Temp 98.6  F (37  C) (Tympanic)   Resp 12   Ht 1.64 m (5' 4.57\")   Wt 55.4 kg (122 lb 2 oz)   LMP  (LMP Unknown)   SpO2 100%   BMI 20.60 kg/m     Estimated body mass index is 20.6 kg/m  as calculated from the following:    Height as of this encounter: 1.64 m (5' 4.57\").    Weight as of this encounter: 55.4 kg (122 lb 2 oz).    Physical Exam    Physical Exam:  General Appearance: Alert, cooperative, no distress, appears stated age   Head: Normocephalic, without obvious abnormality, atraumatic  Eyes: PERRL, conjunctiva/corneas clear, EOM's intact   Ears: Normal TM's and external ear canals, both ears  Nose:Nares normal, septum midline,mucosa normal, no drainage    Throat:Lips, mucosa, and tongue normal; teeth and gums normal  Neck: Supple, symmetrical, trachea midline, no adenopathy;  thyroid: not enlarged, symmetric, no tenderness/mass/nodules  Back: Symmetric, no curvature, ROM normal,  Lungs: Clear to auscultation bilaterally, respirations unlabored  Breasts: No breast masses, tenderness, asymmetry, or nipple discharge.  Heart: Regular rate and rhythm, S1 and S2 normal, no murmur, rub, or gallop  Abdomen: Soft, non-tender, bowel sounds active all four quadrants,  no masses, no organomegaly  Extremities: Extremities normal, atraumatic, no cyanosis or edema  Skin: Skin color, texture, turgor normal, no rashes or lesions  Lymph nodes: Cervical, supraclavicular, and axillary nodes normal and   Neurologic: Normal          Signed Electronically by: Alla Vang, " MD

## 2024-12-05 ENCOUNTER — PATIENT OUTREACH (OUTPATIENT)
Dept: CARE COORDINATION | Facility: CLINIC | Age: 44
End: 2024-12-05
Payer: COMMERCIAL

## 2025-01-24 DIAGNOSIS — I47.19 JUNCTIONAL TACHYCARDIA: ICD-10-CM

## 2025-01-24 DIAGNOSIS — I47.19 JUNCTIONAL ECTOPIC TACHYCARDIA: ICD-10-CM

## 2025-01-27 RX ORDER — ATENOLOL 25 MG/1
TABLET ORAL
Qty: 90 TABLET | Refills: 3 | Status: SHIPPED | OUTPATIENT
Start: 2025-01-27

## 2025-07-21 ENCOUNTER — PATIENT OUTREACH (OUTPATIENT)
Dept: CARE COORDINATION | Facility: CLINIC | Age: 45
End: 2025-07-21
Payer: COMMERCIAL